# Patient Record
Sex: MALE | Race: WHITE | NOT HISPANIC OR LATINO | URBAN - METROPOLITAN AREA
[De-identification: names, ages, dates, MRNs, and addresses within clinical notes are randomized per-mention and may not be internally consistent; named-entity substitution may affect disease eponyms.]

---

## 2017-05-10 ENCOUNTER — OUTPATIENT (OUTPATIENT)
Dept: OUTPATIENT SERVICES | Facility: HOSPITAL | Age: 62
LOS: 1 days | Discharge: HOME | End: 2017-05-10

## 2017-06-28 DIAGNOSIS — R10.13 EPIGASTRIC PAIN: ICD-10-CM

## 2018-08-28 PROBLEM — Z00.00 ENCOUNTER FOR PREVENTIVE HEALTH EXAMINATION: Status: ACTIVE | Noted: 2018-08-28

## 2018-10-04 ENCOUNTER — APPOINTMENT (OUTPATIENT)
Dept: UROLOGY | Facility: CLINIC | Age: 63
End: 2018-10-04
Payer: COMMERCIAL

## 2018-10-04 VITALS
DIASTOLIC BLOOD PRESSURE: 63 MMHG | WEIGHT: 240 LBS | SYSTOLIC BLOOD PRESSURE: 120 MMHG | HEIGHT: 72 IN | BODY MASS INDEX: 32.51 KG/M2 | HEART RATE: 84 BPM

## 2018-10-04 DIAGNOSIS — I10 ESSENTIAL (PRIMARY) HYPERTENSION: ICD-10-CM

## 2018-10-04 DIAGNOSIS — Z78.9 OTHER SPECIFIED HEALTH STATUS: ICD-10-CM

## 2018-10-04 DIAGNOSIS — R97.20 ELEVATED PROSTATE, SPECIFIC ANTIGEN [PSA]: ICD-10-CM

## 2018-10-04 DIAGNOSIS — Z82.49 FAMILY HISTORY OF ISCHEMIC HEART DISEASE AND OTHER DISEASES OF THE CIRCULATORY SYSTEM: ICD-10-CM

## 2018-10-04 DIAGNOSIS — Z81.8 FAMILY HISTORY OF OTHER MENTAL AND BEHAVIORAL DISORDERS: ICD-10-CM

## 2018-10-04 DIAGNOSIS — E78.00 PURE HYPERCHOLESTEROLEMIA, UNSPECIFIED: ICD-10-CM

## 2018-10-04 DIAGNOSIS — R39.9 UNSPECIFIED SYMPTOMS AND SIGNS INVOLVING THE GENITOURINARY SYSTEM: ICD-10-CM

## 2018-10-04 DIAGNOSIS — Z84.1 FAMILY HISTORY OF DISORDERS OF KIDNEY AND URETER: ICD-10-CM

## 2018-10-04 DIAGNOSIS — Z80.42 FAMILY HISTORY OF MALIGNANT NEOPLASM OF PROSTATE: ICD-10-CM

## 2018-10-04 PROCEDURE — 99203 OFFICE O/P NEW LOW 30 MIN: CPT

## 2018-10-04 NOTE — PHYSICAL EXAM
[General Appearance - Well Developed] : well developed [General Appearance - Well Nourished] : well nourished [Normal Appearance] : normal appearance [Well Groomed] : well groomed [General Appearance - In No Acute Distress] : no acute distress [Edema] : no peripheral edema [Respiration, Rhythm And Depth] : normal respiratory rhythm and effort [Exaggerated Use Of Accessory Muscles For Inspiration] : no accessory muscle use [Abdomen Soft] : soft [Abdomen Tenderness] : non-tender [Costovertebral Angle Tenderness] : no ~M costovertebral angle tenderness [Normal Station and Gait] : the gait and station were normal for the patient's age [] : no rash [No Focal Deficits] : no focal deficits [Oriented To Time, Place, And Person] : oriented to person, place, and time [Affect] : the affect was normal [Mood] : the mood was normal [Not Anxious] : not anxious

## 2018-10-08 NOTE — LETTER BODY
[Dear  ___] : Dear  [unfilled], [Consult Letter:] : I had the pleasure of evaluating your patient, [unfilled]. [Please see my note below.] : Please see my note below. [Consult Closing:] : Thank you very much for allowing me to participate in the care of this patient.  If you have any questions, please do not hesitate to contact me. [Sincerely,] : Sincerely, [FreeTextEntry2] : Dr. Poncho Ochoa

## 2018-10-08 NOTE — HISTORY OF PRESENT ILLNESS
[Currently Experiencing ___] :  [unfilled] [Urinary Retention] : urinary retention [Urinary Urgency] : urinary urgency [Urinary Frequency] : urinary frequency [Nocturia] : nocturia [Intermittency] : intermittency [None] : None [FreeTextEntry1] : JADEN ARANA is a 63 year old male with a past medical history of HTN and LUTS. Presents to the office today for elevated PSA referred by PMD. Never had a prostate biopsy done in the past. Patient had PSA done and 4.5 ng/ml compared to 1.8 ng/ml in 2017.Patient currently has urinary frequency, urgency, nocturia 3 -4 x a night. Patient was placed on Tamsulosin x 1 month prescribed by PMD and stopped it on its own, saw minimal difference in urine and had retrograde ejaculation.Denies hematuria and dysuria.Never a smoker. Patient father diagnosed of prostate cancer at age 60. Patients brother diagnosed with bladder cancer at age 58. \par \par 8/15/2018\par PSA 4.5 ng/ml [Urinary Incontinence] : no urinary incontinence [Straining] : no straining [Weak Stream] : no weak stream [Dysuria] : no dysuria [Hematuria - Gross] : no gross hematuria

## 2018-10-08 NOTE — ASSESSMENT
[FreeTextEntry1] : 63 year old with a history of LUTS and elevated PSA\par \par -UA and urine culture ordered\par -Renal/Bladder US ordered\par -4 K Test ordered\par F/U in 3 weeks

## 2018-10-09 ENCOUNTER — OUTPATIENT (OUTPATIENT)
Dept: OUTPATIENT SERVICES | Facility: HOSPITAL | Age: 63
LOS: 1 days | Discharge: HOME | End: 2018-10-09

## 2018-10-09 DIAGNOSIS — R39.9 UNSPECIFIED SYMPTOMS AND SIGNS INVOLVING THE GENITOURINARY SYSTEM: ICD-10-CM

## 2018-10-29 ENCOUNTER — APPOINTMENT (OUTPATIENT)
Dept: UROLOGY | Facility: CLINIC | Age: 63
End: 2018-10-29
Payer: COMMERCIAL

## 2018-10-29 VITALS
SYSTOLIC BLOOD PRESSURE: 137 MMHG | HEIGHT: 72 IN | HEART RATE: 73 BPM | WEIGHT: 245 LBS | BODY MASS INDEX: 33.18 KG/M2 | DIASTOLIC BLOOD PRESSURE: 80 MMHG

## 2018-10-29 PROCEDURE — 99214 OFFICE O/P EST MOD 30 MIN: CPT

## 2018-10-29 NOTE — LETTER BODY
[Dear  ___] : Dear  [unfilled], [Consult Letter:] : I had the pleasure of evaluating your patient, [unfilled]. [Please see my note below.] : Please see my note below. [Consult Closing:] : Thank you very much for allowing me to participate in the care of this patient.  If you have any questions, please do not hesitate to contact me. [Sincerely,] : Sincerely, [FreeTextEntry3] : Alfredo Rodriguez MD, FACS\par

## 2019-04-18 ENCOUNTER — APPOINTMENT (OUTPATIENT)
Dept: UROLOGY | Facility: CLINIC | Age: 64
End: 2019-04-18
Payer: COMMERCIAL

## 2019-04-18 PROCEDURE — 99213 OFFICE O/P EST LOW 20 MIN: CPT

## 2019-04-18 NOTE — HISTORY OF PRESENT ILLNESS
[Nocturia] : nocturia [FreeTextEntry1] : 62 yo with elevated PSA and lower urinary tract symptoms - mostly nocturia\par patient had tried flomax in the past without improvement\par NOW IS ONLY ON TUMERIC\par \par renal and bladder US 10/9/2018\par unremarkable renal and bladder ultrasound\par prostate 78 grams\par median lobe hypertrophy\par prevoid 527 with 45 cc\par \par 4k score of 8  10/2018\par PSA 2.45 ng/ml\par \par REPEAT PSA 4/2019\par 2.6 NG/ML\par \par \par

## 2019-04-18 NOTE — PHYSICAL EXAM
[General Appearance - Well Nourished] : well nourished [General Appearance - Well Developed] : well developed [Normal Appearance] : normal appearance [Well Groomed] : well groomed [General Appearance - In No Acute Distress] : no acute distress [Abdomen Soft] : soft [Abdomen Tenderness] : non-tender [Costovertebral Angle Tenderness] : no ~M costovertebral angle tenderness [Edema] : no peripheral edema [] : no respiratory distress [Exaggerated Use Of Accessory Muscles For Inspiration] : no accessory muscle use [Respiration, Rhythm And Depth] : normal respiratory rhythm and effort [Oriented To Time, Place, And Person] : oriented to person, place, and time [Mood] : the mood was normal [Affect] : the affect was normal [Not Anxious] : not anxious [Normal Station and Gait] : the gait and station were normal for the patient's age [No Focal Deficits] : no focal deficits [No Palpable Adenopathy] : no palpable adenopathy

## 2019-04-18 NOTE — LETTER BODY
[Dear  ___] : Dear  [unfilled], [Consult Letter:] : I had the pleasure of evaluating your patient, [unfilled]. [Consult Closing:] : Thank you very much for allowing me to participate in the care of this patient.  If you have any questions, please do not hesitate to contact me. [Please see my note below.] : Please see my note below. [Sincerely,] : Sincerely, [FreeTextEntry3] : Alfredo Rodriguez MD, FACS\par

## 2019-04-18 NOTE — ASSESSMENT
[FreeTextEntry1] : 62 yo with elevated PSA with dramatic drop AND STABILIZATION in PSA without intervention \par \par \par all his questions were answered\par \par - PSA in 12 months\par - renal and bladder us with pvr in 12 months\par - F/U in 12 months\par

## 2019-09-27 ENCOUNTER — OUTPATIENT (OUTPATIENT)
Dept: OUTPATIENT SERVICES | Facility: HOSPITAL | Age: 64
LOS: 1 days | Discharge: HOME | End: 2019-09-27
Payer: COMMERCIAL

## 2019-09-27 DIAGNOSIS — R97.20 ELEVATED PROSTATE SPECIFIC ANTIGEN [PSA]: ICD-10-CM

## 2019-09-27 PROCEDURE — 76770 US EXAM ABDO BACK WALL COMP: CPT | Mod: 26

## 2019-10-31 ENCOUNTER — APPOINTMENT (OUTPATIENT)
Dept: UROLOGY | Facility: CLINIC | Age: 64
End: 2019-10-31
Payer: COMMERCIAL

## 2019-10-31 DIAGNOSIS — N13.8 BENIGN PROSTATIC HYPERPLASIA WITH LOWER URINARY TRACT SYMPMS: ICD-10-CM

## 2019-10-31 DIAGNOSIS — N40.1 BENIGN PROSTATIC HYPERPLASIA WITH LOWER URINARY TRACT SYMPMS: ICD-10-CM

## 2019-10-31 DIAGNOSIS — R97.20 ELEVATED PROSTATE, SPECIFIC ANTIGEN [PSA]: ICD-10-CM

## 2019-10-31 PROCEDURE — 99214 OFFICE O/P EST MOD 30 MIN: CPT

## 2019-11-05 PROBLEM — R97.20 ELEVATED PROSTATE SPECIFIC ANTIGEN (PSA): Status: ACTIVE | Noted: 2018-10-08

## 2019-11-05 PROBLEM — N40.1 BENIGN LOCALIZED HYPERPLASIA OF PROSTATE WITH URINARY OBSTRUCTION: Status: ACTIVE | Noted: 2018-10-29

## 2019-11-05 NOTE — HISTORY OF PRESENT ILLNESS
[Nocturia] : nocturia [FreeTextEntry1] : 62 yo with elevated PSA and lower urinary tract symptoms - mostly nocturia\par patient had tried flomax in the past without improvement\par NOW IS ONLY ON TUMERIC\par \par 10/2019 renal and bladder US\par FINDINGS: \par \par RIGHT KIDNEY: Normal in echogenicity, size measuring 12.3 cm in length. No \par evidence of hydronephrosis, calculus or solid mass. 1.3 cm lower pole cyst \par is present, without significant change since the prior exam. Vascular flow \par is demonstrated at the hilum. \par \par LEFT KIDNEY: Normal in echogenicity, size measuring 12.7 cm in length. No \par evidence of hydronephrosis, calculus or solid mass. 0.8 cm interpolar cyst \par is present, without significant change since the prior exam. Vascular flow \par is demonstrated at the hilum. \par \par URINARY BLADDER: Prevoid volume of approximately 342 cc. No wall \par thickening, debris or calculus is seen. Bilateral ureteral jets are \par visualized. Postvoid volume is approximately 37 cc. \par \par PROSTATE: The prostate measures approximately 67 cc, enlarged. \par \par IMPRESSION: \par \par Since 10/9/2018: \par \par Prostatomegaly, measuring 67 cc in volume, previously 78 cc. \par \par Post void residual of 37 cc. \par \par No stones or hydronephrosis. \par \par \par renal and bladder US 10/9/2018\par unremarkable renal and bladder ultrasound\par prostate 78 grams\par median lobe hypertrophy\par prevoid 527 with 45 cc\par \par 4k score of 8  10/2018\par PSA 2.45 ng/ml\par \par REPEAT PSA 4/2019\par 2.6 NG/ML\par \par PSA 10/2019\par 3.2 ng/ml\par UA wnl \par

## 2019-11-05 NOTE — ASSESSMENT
[FreeTextEntry1] : 63 yo with elevated PSA \par \par - MRI prostate\par - f/u after MRI to discuss standard vs MRI guided biopsy\par patient will need biopsy irrespective

## 2019-11-07 ENCOUNTER — OUTPATIENT (OUTPATIENT)
Dept: OUTPATIENT SERVICES | Facility: HOSPITAL | Age: 64
LOS: 1 days | Discharge: HOME | End: 2019-11-07
Payer: COMMERCIAL

## 2019-11-07 DIAGNOSIS — R05 COUGH: ICD-10-CM

## 2019-11-07 PROCEDURE — 71046 X-RAY EXAM CHEST 2 VIEWS: CPT | Mod: 26

## 2019-12-05 ENCOUNTER — OTHER (OUTPATIENT)
Age: 64
End: 2019-12-05

## 2019-12-19 ENCOUNTER — APPOINTMENT (OUTPATIENT)
Dept: UROLOGY | Facility: CLINIC | Age: 64
End: 2019-12-19

## 2020-11-29 ENCOUNTER — OUTPATIENT (OUTPATIENT)
Dept: OUTPATIENT SERVICES | Facility: HOSPITAL | Age: 65
LOS: 1 days | Discharge: HOME | End: 2020-11-29

## 2020-11-29 DIAGNOSIS — Z11.59 ENCOUNTER FOR SCREENING FOR OTHER VIRAL DISEASES: ICD-10-CM

## 2020-12-02 ENCOUNTER — OUTPATIENT (OUTPATIENT)
Dept: OUTPATIENT SERVICES | Facility: HOSPITAL | Age: 65
LOS: 1 days | Discharge: HOME | End: 2020-12-02

## 2020-12-02 VITALS
OXYGEN SATURATION: 100 % | RESPIRATION RATE: 20 BRPM | WEIGHT: 265 LBS | HEART RATE: 82 BPM | SYSTOLIC BLOOD PRESSURE: 124 MMHG | DIASTOLIC BLOOD PRESSURE: 72 MMHG | TEMPERATURE: 97 F | HEIGHT: 72 IN

## 2020-12-02 VITALS — DIASTOLIC BLOOD PRESSURE: 67 MMHG | SYSTOLIC BLOOD PRESSURE: 139 MMHG | RESPIRATION RATE: 15 BRPM | HEART RATE: 76 BPM

## 2020-12-02 DIAGNOSIS — Z98.1 ARTHRODESIS STATUS: Chronic | ICD-10-CM

## 2020-12-04 DIAGNOSIS — E78.00 PURE HYPERCHOLESTEROLEMIA, UNSPECIFIED: ICD-10-CM

## 2020-12-04 DIAGNOSIS — H52.202 UNSPECIFIED ASTIGMATISM, LEFT EYE: ICD-10-CM

## 2020-12-04 DIAGNOSIS — I10 ESSENTIAL (PRIMARY) HYPERTENSION: ICD-10-CM

## 2020-12-04 DIAGNOSIS — E11.9 TYPE 2 DIABETES MELLITUS WITHOUT COMPLICATIONS: ICD-10-CM

## 2020-12-04 DIAGNOSIS — H52.4 PRESBYOPIA: ICD-10-CM

## 2020-12-04 DIAGNOSIS — H25.12 AGE-RELATED NUCLEAR CATARACT, LEFT EYE: ICD-10-CM

## 2020-12-04 DIAGNOSIS — Z98.1 ARTHRODESIS STATUS: ICD-10-CM

## 2020-12-13 ENCOUNTER — OUTPATIENT (OUTPATIENT)
Dept: OUTPATIENT SERVICES | Facility: HOSPITAL | Age: 65
LOS: 1 days | Discharge: HOME | End: 2020-12-13

## 2020-12-13 DIAGNOSIS — Z11.59 ENCOUNTER FOR SCREENING FOR OTHER VIRAL DISEASES: ICD-10-CM

## 2020-12-13 DIAGNOSIS — Z98.1 ARTHRODESIS STATUS: Chronic | ICD-10-CM

## 2020-12-15 NOTE — ASU PATIENT PROFILE, ADULT - ALCOHOL USE HISTORY SINGLE SELECT
Detail Level: Detailed
Continue Regimen: Clobetasol scalp solution 0.05% twice a day\\nBiotin 5mg take 1 daily
never

## 2020-12-16 ENCOUNTER — OUTPATIENT (OUTPATIENT)
Dept: OUTPATIENT SERVICES | Facility: HOSPITAL | Age: 65
LOS: 1 days | Discharge: HOME | End: 2020-12-16

## 2020-12-16 VITALS — DIASTOLIC BLOOD PRESSURE: 68 MMHG | SYSTOLIC BLOOD PRESSURE: 138 MMHG | HEART RATE: 72 BPM | RESPIRATION RATE: 17 BRPM

## 2020-12-16 VITALS
DIASTOLIC BLOOD PRESSURE: 60 MMHG | SYSTOLIC BLOOD PRESSURE: 126 MMHG | HEIGHT: 60 IN | TEMPERATURE: 96 F | WEIGHT: 263.89 LBS | OXYGEN SATURATION: 97 % | RESPIRATION RATE: 18 BRPM | HEART RATE: 79 BPM

## 2020-12-16 DIAGNOSIS — Z98.1 ARTHRODESIS STATUS: Chronic | ICD-10-CM

## 2020-12-16 DIAGNOSIS — H26.9 UNSPECIFIED CATARACT: Chronic | ICD-10-CM

## 2020-12-16 NOTE — CHART NOTE - NSCHARTNOTEFT_GEN_A_CORE
PACU ANESTHESIA ADMISSION NOTE      Procedure: Right Eye Cataract Extraction with IOL  Post op diagnosis:  Right Eye Cataract    ____  Intubated  TV:______       Rate: ______      FiO2: ______    __x__  Patent Airway    __x__  Full return of protective reflexes    __x__  Full recovery from anesthesia / back to baseline     Vitals:   T:   37        R:         20         BP:     137/68             Sat:       99            P: 65      Mental Status:  __x__ Awake   _____ Alert   _____ Drowsy   _____ Sedated    Nausea/Vomiting:  __x__ NO  ______Yes,   See Post - Op Orders          Pain Scale (0-10):  _0____    Treatment: ____ None    ___x_ See Post - Op/PCA Orders    Post - Operative Fluids:   ____ Oral   ___x_ See Post - Op Orders    Plan: Discharge:   _x___Home       _____Floor     _____Critical Care    _____  Other:_________________    Comments:

## 2020-12-22 DIAGNOSIS — H52.4 PRESBYOPIA: ICD-10-CM

## 2020-12-22 DIAGNOSIS — E11.9 TYPE 2 DIABETES MELLITUS WITHOUT COMPLICATIONS: ICD-10-CM

## 2020-12-22 DIAGNOSIS — N40.0 BENIGN PROSTATIC HYPERPLASIA WITHOUT LOWER URINARY TRACT SYMPTOMS: ICD-10-CM

## 2020-12-22 DIAGNOSIS — Z98.1 ARTHRODESIS STATUS: ICD-10-CM

## 2020-12-22 DIAGNOSIS — H25.11 AGE-RELATED NUCLEAR CATARACT, RIGHT EYE: ICD-10-CM

## 2020-12-22 DIAGNOSIS — I10 ESSENTIAL (PRIMARY) HYPERTENSION: ICD-10-CM

## 2020-12-22 DIAGNOSIS — E78.5 HYPERLIPIDEMIA, UNSPECIFIED: ICD-10-CM

## 2020-12-22 DIAGNOSIS — E78.00 PURE HYPERCHOLESTEROLEMIA, UNSPECIFIED: ICD-10-CM

## 2021-05-02 ENCOUNTER — OUTPATIENT (OUTPATIENT)
Dept: OUTPATIENT SERVICES | Facility: HOSPITAL | Age: 66
LOS: 1 days | Discharge: HOME | End: 2021-05-02
Payer: MEDICARE

## 2021-05-02 DIAGNOSIS — Z13.1 ENCOUNTER FOR SCREENING FOR DIABETES MELLITUS: ICD-10-CM

## 2021-05-02 DIAGNOSIS — H26.9 UNSPECIFIED CATARACT: Chronic | ICD-10-CM

## 2021-05-02 DIAGNOSIS — Z98.1 ARTHRODESIS STATUS: Chronic | ICD-10-CM

## 2021-05-02 PROCEDURE — 93880 EXTRACRANIAL BILAT STUDY: CPT | Mod: 26

## 2021-07-20 ENCOUNTER — OUTPATIENT (OUTPATIENT)
Dept: OUTPATIENT SERVICES | Facility: HOSPITAL | Age: 66
LOS: 1 days | Discharge: HOME | End: 2021-07-20
Payer: MEDICARE

## 2021-07-20 DIAGNOSIS — Z98.1 ARTHRODESIS STATUS: Chronic | ICD-10-CM

## 2021-07-20 DIAGNOSIS — H26.9 UNSPECIFIED CATARACT: Chronic | ICD-10-CM

## 2021-07-20 DIAGNOSIS — R10.32 LEFT LOWER QUADRANT PAIN: ICD-10-CM

## 2021-07-20 PROCEDURE — 74176 CT ABD & PELVIS W/O CONTRAST: CPT | Mod: 26,MH

## 2021-12-18 ENCOUNTER — OUTPATIENT (OUTPATIENT)
Dept: OUTPATIENT SERVICES | Facility: HOSPITAL | Age: 66
LOS: 1 days | Discharge: HOME | End: 2021-12-18
Payer: MEDICARE

## 2021-12-18 DIAGNOSIS — R05.9 COUGH, UNSPECIFIED: ICD-10-CM

## 2021-12-18 DIAGNOSIS — Z98.1 ARTHRODESIS STATUS: Chronic | ICD-10-CM

## 2021-12-18 DIAGNOSIS — H26.9 UNSPECIFIED CATARACT: Chronic | ICD-10-CM

## 2021-12-18 PROCEDURE — 71046 X-RAY EXAM CHEST 2 VIEWS: CPT | Mod: 26

## 2022-01-03 NOTE — ASU PATIENT PROFILE, ADULT - MEDICATION HERBAL REMEDIES, PROFILE
[facility]  [facility]  [address], [address]  [place] [address]  [phone]    They will contact you about a counseling appointment.       [facility].     over the counter hydrocortisone 2 times a day for 7-10 days. If still there take a 1 week break and then restart.      Check blood pressure at home. Send Criselda a HBCS message with your readings sometime later this week      Patient Education     Preventive Health Recommendations  Male Ages 40 to 49    Yearly exam:             See your health care provider every year in order to  o   Review health changes.   o   Discuss preventive care.    o   Review your medicines if your doctor has prescribed any.    You should be tested each year for STDs (sexually transmitted diseases) if you re at risk.     Have a cholesterol test every 5 years.     Have a colonoscopy (test for colon cancer) if someone in your family has had colon cancer or polyps before age 50.     After age 45, have a diabetes test (fasting glucose). If you are at risk for diabetes, you should have this test every 3 years.      Talk with your health care provider about whether or not a prostate cancer screening test (PSA) is right for you.    Shots: Get a flu shot each year. Get a tetanus shot every 10 years.     Nutrition:    Eat at least 5 servings of fruits and vegetables daily.     Eat whole-grain bread, whole-wheat pasta and brown rice instead of white grains and rice.     Get adequate Calcium and Vitamin D.     Lifestyle    Exercise for at least 150 minutes a week (30 minutes a day, 5 days a week). This will help you control your weight and prevent disease.     Limit alcohol to one drink per day.     No smoking.     Wear sunscreen to prevent skin cancer.     See your dentist every six months for an exam and cleaning.      
no

## 2022-04-29 ENCOUNTER — OUTPATIENT (OUTPATIENT)
Dept: OUTPATIENT SERVICES | Facility: HOSPITAL | Age: 67
LOS: 1 days | Discharge: HOME | End: 2022-04-29
Payer: MEDICARE

## 2022-04-29 DIAGNOSIS — Z98.1 ARTHRODESIS STATUS: Chronic | ICD-10-CM

## 2022-04-29 DIAGNOSIS — H26.9 UNSPECIFIED CATARACT: Chronic | ICD-10-CM

## 2022-04-29 DIAGNOSIS — R10.9 UNSPECIFIED ABDOMINAL PAIN: ICD-10-CM

## 2022-04-29 PROCEDURE — 74177 CT ABD & PELVIS W/CONTRAST: CPT | Mod: 26,MH

## 2022-06-06 ENCOUNTER — OUTPATIENT (OUTPATIENT)
Dept: OUTPATIENT SERVICES | Facility: HOSPITAL | Age: 67
LOS: 1 days | Discharge: HOME | End: 2022-06-06
Payer: MEDICARE

## 2022-06-06 DIAGNOSIS — R91.1 SOLITARY PULMONARY NODULE: ICD-10-CM

## 2022-06-06 DIAGNOSIS — Z98.1 ARTHRODESIS STATUS: Chronic | ICD-10-CM

## 2022-06-06 DIAGNOSIS — H26.9 UNSPECIFIED CATARACT: Chronic | ICD-10-CM

## 2022-06-06 PROCEDURE — 71250 CT THORAX DX C-: CPT | Mod: 26,MH

## 2022-07-29 NOTE — ASU PREOP CHECKLIST - BSA (M2)
Post-Op Assessment Note    CV Status:  Stable  Pain Score: 0    Pain management: adequate     Mental Status:  Alert and awake   Hydration Status:  Euvolemic   PONV Controlled:  Controlled   Airway Patency:  Patent      Post Op Vitals Reviewed: Yes      Staff: CRNA         No complications documented      /65 (07/29/22 0947)    Temp (!) 96 8 °F (36 °C) (07/29/22 0947)    Pulse 64 (07/29/22 0947)   Resp 15 (07/29/22 0947)    SpO2 97 % (07/29/22 0947) 0

## 2022-10-17 ENCOUNTER — OUTPATIENT (OUTPATIENT)
Dept: OUTPATIENT SERVICES | Facility: HOSPITAL | Age: 67
LOS: 1 days | Discharge: HOME | End: 2022-10-17

## 2022-10-17 DIAGNOSIS — H26.9 UNSPECIFIED CATARACT: Chronic | ICD-10-CM

## 2022-10-17 DIAGNOSIS — Z98.1 ARTHRODESIS STATUS: Chronic | ICD-10-CM

## 2022-10-17 DIAGNOSIS — R91.1 SOLITARY PULMONARY NODULE: ICD-10-CM

## 2022-10-17 PROCEDURE — 71250 CT THORAX DX C-: CPT | Mod: 26,MH

## 2022-11-15 ENCOUNTER — APPOINTMENT (OUTPATIENT)
Dept: PLASTIC SURGERY | Facility: CLINIC | Age: 67
End: 2022-11-15

## 2022-11-15 VITALS — HEIGHT: 72 IN | WEIGHT: 220 LBS | BODY MASS INDEX: 29.8 KG/M2

## 2022-11-15 PROCEDURE — 99203 OFFICE O/P NEW LOW 30 MIN: CPT

## 2022-11-15 NOTE — ASSESSMENT
[FreeTextEntry1] : Regarding the procedure, we discussed scarring, poor wound healing, bleeding, infection, need for additional surgery, and dissatisfaction with the outcome.  Also discussed possibility of keloid and/or hypertrophic scar formation as well as recurrence.  All questions were answered and risks understood.\par \par office procedure\par \par Due to COVID 19, pre-visit patient instructions were explained to the patient and their symptoms were checked upon arrival.  \par Masks were used by the health care providers and staff and the examination room was cleaned after the patient visit was completed.\par

## 2022-11-15 NOTE — PHYSICAL EXAM
[NI] : Normal [de-identified] : right upepr lateral arm subq cyst approx 4mm x 6mm   mobile, firm

## 2022-11-15 NOTE — HISTORY OF PRESENT ILLNESS
[FreeTextEntry1] : 67 yr old male with right upper arm subq lesion present for a few years\par \par derm saw and referred here\par \par nonsmoker\par nondiabetic\par BPH\par HTN\par \par

## 2022-12-22 ENCOUNTER — APPOINTMENT (OUTPATIENT)
Dept: PLASTIC SURGERY | Facility: CLINIC | Age: 67
End: 2022-12-22

## 2023-01-05 ENCOUNTER — APPOINTMENT (OUTPATIENT)
Dept: PLASTIC SURGERY | Facility: CLINIC | Age: 68
End: 2023-01-05

## 2023-01-11 ENCOUNTER — APPOINTMENT (OUTPATIENT)
Dept: PLASTIC SURGERY | Facility: CLINIC | Age: 68
End: 2023-01-11
Payer: MEDICARE

## 2023-01-11 PROCEDURE — 11401 EXC TR-EXT B9+MARG 0.6-1 CM: CPT

## 2023-01-11 PROCEDURE — 13120 CMPLX RPR S/A/L 1.1-2.5 CM: CPT | Mod: 59

## 2023-01-11 NOTE — ASSESSMENT
[FreeTextEntry1] : 68 yo M with right upper arm epidermal inclusion cyst s/p office excision today under local anesthesia. Tolerated well. \par \par - wound care instructions discussed \par - f/u 10-12 days for pathology and suture removal

## 2023-01-11 NOTE — PROCEDURE
[___ ml Inj] : Anesthesia: [unfilled] ~Uml [1%] : 1% [With Epi] : with epinephrine [Betadine] : using betadine [FreeTextEntry1] : Right upper arm cyst 1x0.8 cm [FreeTextEntry2] : Excision of right upper arm cyst  [FreeTextEntry3] : local  [FreeTextEntry4] : minimal  [FreeTextEntry5] : none  [FreeTextEntry6] : After administration of local anesthetic agent and confirmation of appropriate effect, the area was prepped and draped in the usual sterile fashion. Incision was then made over the cyst with a #15 scalpel. Cyst was clearly visualized and carefully dissected out with dissection scissors and removed in its entirety without rupture. The wound was then irrigated with NS/Betadine mix and closed in layers with 3-0 Monocryl deep dermal and subcuticular. \par the incision measured 1.5 cm in length. \par \par Clean sterile dressing was then applied. \par \par Patient tolerated the procedure well and did not have any complications.  [FreeTextEntry7] : right upper arm cyst

## 2023-01-23 ENCOUNTER — APPOINTMENT (OUTPATIENT)
Dept: PLASTIC SURGERY | Facility: CLINIC | Age: 68
End: 2023-01-23
Payer: MEDICARE

## 2023-01-23 DIAGNOSIS — L72.0 EPIDERMAL CYST: ICD-10-CM

## 2023-01-23 PROCEDURE — 99212 OFFICE O/P EST SF 10 MIN: CPT

## 2023-01-23 NOTE — ASSESSMENT
[FreeTextEntry1] : 66 yo M with right upper arm cyst now POD#12 s/p office excision. Doing very well. No active issues. \par \par - suture tails removed, steri strip applied\par - may get wet\par - daily Aquaphor\par - start silicone-based products next week\par - pathology discussed - benign keratinous cyst\par - post-op instructions discussed and all his questions were answered\par - f/u PRN \par \par Due to COVID 19, pre-visit patient instructions were explained to the patient and their symptoms were checked upon arrival.  \par Masks were used by the health care providers and staff and the examination room was cleaned after the patient visit was completed.\par

## 2023-01-23 NOTE — PHYSICAL EXAM
[NI] : Normal [de-identified] : NAD [de-identified] : unlabored breathing  [de-identified] : Right upper arm incision healing very well, c/d/i, no erythema or swelling, good cosmesis

## 2023-01-23 NOTE — HISTORY OF PRESENT ILLNESS
[FreeTextEntry1] : 67 yr old male with PMHx of HTN, HLD, BPH with right upper arm subcutaneous lesion present for a few years\par \par derm saw and referred here\par \par nonsmoker\par nondiabetic\par \par Interval hx (1/23/23) Pt here POD#12 s/p excision of right upper arm cyst. Doing very well with no complaints of pain, f/c or drainage. \par

## 2023-01-23 NOTE — DATA REVIEWED
[FreeTextEntry1] :  Tissue Biopsy             Final\par \par No Documents Attached\par \par \par   Patient:   JADEN ARANA\par \par \par Accession:                             [id number]\par \par Collected Date/Time:                   1/11/2023 11:52 EST\par Received Date/Time:                    1/12/2023 09:13 EST\par \par Surgical Pathology Report - Auth (Verified)\par \par Specimen(s) Submitted\par Right upper arm cyst\par \par Final Diagnosis\par Right up cyst, excision:\par -Benign keratinous cyst.\par \par Verified by: Jennifer Man M.D.\par (Electronic Signature)\par Reported on: 01/13/23 18:48 EST, [facility] [address]\par Phone: [phone]   Fax: [phone]\par _________________________________________________________________\par \par Clinical Information\par Excision of right upper arm cyst - clinically epidermal inclusion cyst,\par \par  not ruptured\par \par Perioperative Diagnosis\par L72.0-  Epidermal cyst\par \par Gross Description\par Specimen received in formalin, labeled "right upper arm cyst".  Specimen\par  consists of an intact, hard, ovoid cyst, measuring 0.8 x 0.5 x 0.5 cm.\par   The external surface is inked black.  Specimen is bisected, the cross-\par section reveals tan-yellow, cheesy material.  Submitted entirely.  (1\par  block)\par \par Specimen was received and underwent gross examination at [place] [id number].\par \par 01/12/2023 13:33:57 EST   RR\par \par  \par \par  Ordered by: RITCHIE WELLS       Collected/Examined: 11Jan2023 11:52AM       \par Verification Required       Stage: Final       \par  Performed at: [facility] (Med Director: Alejo Garcia)       Resulted: 13Jan2023 06:48PM       Last Updated: 13Jan2023 06:48PM       Accession: [id number]

## 2023-07-05 ENCOUNTER — OUTPATIENT (OUTPATIENT)
Dept: OUTPATIENT SERVICES | Facility: HOSPITAL | Age: 68
LOS: 1 days | End: 2023-07-05
Payer: MEDICARE

## 2023-07-05 DIAGNOSIS — I25.10 ATHEROSCLEROTIC HEART DISEASE OF NATIVE CORONARY ARTERY WITHOUT ANGINA PECTORIS: ICD-10-CM

## 2023-07-05 DIAGNOSIS — Z00.8 ENCOUNTER FOR OTHER GENERAL EXAMINATION: ICD-10-CM

## 2023-07-05 DIAGNOSIS — H26.9 UNSPECIFIED CATARACT: Chronic | ICD-10-CM

## 2023-07-05 DIAGNOSIS — Z98.1 ARTHRODESIS STATUS: Chronic | ICD-10-CM

## 2023-07-05 PROCEDURE — 75571 CT HRT W/O DYE W/CA TEST: CPT | Mod: 26,MH

## 2023-07-05 PROCEDURE — 75571 CT HRT W/O DYE W/CA TEST: CPT

## 2023-07-06 DIAGNOSIS — I25.10 ATHEROSCLEROTIC HEART DISEASE OF NATIVE CORONARY ARTERY WITHOUT ANGINA PECTORIS: ICD-10-CM

## 2023-07-26 ENCOUNTER — OUTPATIENT (OUTPATIENT)
Dept: OUTPATIENT SERVICES | Facility: HOSPITAL | Age: 68
LOS: 1 days | End: 2023-07-26
Payer: MEDICARE

## 2023-07-26 DIAGNOSIS — Z00.8 ENCOUNTER FOR OTHER GENERAL EXAMINATION: ICD-10-CM

## 2023-07-26 DIAGNOSIS — H26.9 UNSPECIFIED CATARACT: Chronic | ICD-10-CM

## 2023-07-26 DIAGNOSIS — Z98.1 ARTHRODESIS STATUS: Chronic | ICD-10-CM

## 2023-07-26 DIAGNOSIS — R07.9 CHEST PAIN, UNSPECIFIED: ICD-10-CM

## 2023-07-26 PROCEDURE — 71275 CT ANGIOGRAPHY CHEST: CPT

## 2023-07-26 PROCEDURE — 71275 CT ANGIOGRAPHY CHEST: CPT | Mod: 26,MH

## 2023-07-27 DIAGNOSIS — R07.9 CHEST PAIN, UNSPECIFIED: ICD-10-CM

## 2024-01-18 ENCOUNTER — APPOINTMENT (OUTPATIENT)
Dept: ELECTROPHYSIOLOGY | Facility: CLINIC | Age: 69
End: 2024-01-18
Payer: MEDICARE

## 2024-01-18 VITALS
WEIGHT: 215 LBS | TEMPERATURE: 98 F | DIASTOLIC BLOOD PRESSURE: 73 MMHG | HEIGHT: 72 IN | HEART RATE: 81 BPM | SYSTOLIC BLOOD PRESSURE: 114 MMHG | BODY MASS INDEX: 29.12 KG/M2

## 2024-01-18 PROCEDURE — 99205 OFFICE O/P NEW HI 60 MIN: CPT

## 2024-01-18 PROCEDURE — 93000 ELECTROCARDIOGRAM COMPLETE: CPT

## 2024-01-18 RX ORDER — OMEPRAZOLE 40 MG/1
40 CAPSULE, DELAYED RELEASE ORAL
Qty: 90 | Refills: 0 | Status: ACTIVE | COMMUNITY

## 2024-01-18 RX ORDER — ASPIRIN 81 MG
81 TABLET, DELAYED RELEASE (ENTERIC COATED) ORAL DAILY
Refills: 0 | Status: ACTIVE | COMMUNITY

## 2024-01-18 RX ORDER — APIXABAN 5 MG/1
5 TABLET, FILM COATED ORAL
Qty: 60 | Refills: 0 | Status: ACTIVE | COMMUNITY

## 2024-01-18 RX ORDER — LISINOPRIL 10 MG/1
10 TABLET ORAL
Qty: 90 | Refills: 3 | Status: ACTIVE | COMMUNITY

## 2024-01-18 RX ORDER — AMLODIPINE BESYLATE AND BENAZEPRIL HYDROCHLORIDE 5; 20 MG/1; MG/1
5-20 CAPSULE ORAL
Refills: 0 | Status: COMPLETED | COMMUNITY
End: 2024-01-18

## 2024-01-18 RX ORDER — ALPRAZOLAM 0.5 MG/1
0.5 TABLET ORAL
Refills: 0 | Status: ACTIVE | COMMUNITY

## 2024-01-18 RX ORDER — ATORVASTATIN CALCIUM 10 MG/1
10 TABLET, FILM COATED ORAL
Refills: 0 | Status: COMPLETED | COMMUNITY
End: 2024-01-18

## 2024-01-18 RX ORDER — ATORVASTATIN CALCIUM 40 MG/1
40 TABLET, FILM COATED ORAL
Qty: 3 | Refills: 3 | Status: ACTIVE | COMMUNITY

## 2024-01-18 NOTE — HISTORY OF PRESENT ILLNESS
[FreeTextEntry1] : Mr. Sharpe is a 68-year-old male with PMHx of HTN, DLD, aortic root dilatation s/p aortic root replacement without aortic valve replacement, family history of CAD, AF, is here for management of AF.     Accompanied by his wife. Recently moved to [place], [place].      Occasional fatigue.    Had an MCOT which showed 10% AF.    Denies chest pain, shortness of breath, palpitation, dizziness or LOC except noted above.  EKG (01/18/2024): SR @ 81, , , QTc 430  Echo (01/2024): Normal EF, normal LV size, severe LAE.   Cardio: Dr. Dunn

## 2024-01-18 NOTE — ASSESSMENT
[FreeTextEntry1] : ## Paroxysmal Atrial Fibrillation   ## Aortic Root Dilatation s/p Aortic Surgery      - On Eliquis. Compliant. No bleeding issues. Patient to contact us if there is any bleeding issues, interruption or any issues with OAC. Patient to go to ER/call 911 if any major bleeding. - MCOT, echo, outside EKG, and outside note reviewed.   - Appears that patient has new diagnosis of AF s/p surgery. The MCOT showed a 10% burden. However, the EF episodes were distributed among 3 days. The rest of the days he did not have AF and he claims that he was in sinus most of the days.   - I discussed long-term monitoring with ILR with intent to stop anticoagulation if there is no AF.   - If AF episodes are correlated with his fatigue, we will discuss further management with antiarrhythmic versus ablation. Risks versus benefits discussed with the patient. After discussing all pros and cons, we agreed to loop recorder, which will serve two purposes. One purpose is long-term monitoring of AF, and the second purpose is correlation of symptoms with AF. He had an MCOT prior which did not show any symptom correlation. In this scenario, ILR is better because it will also help us with long-term monitoring and with the possibility of discontinuation of anticoagulation safely.    - Discussed importance of risk factor management. - Sleep study/Management of TRENT - Diet/exercise/weight loss - Management of GERD if present  - RTC after ILR.

## 2024-01-18 NOTE — ADDENDUM
[FreeTextEntry1] : Iris LOVE assisted in documentation on 01/18/2024 acting as a scribe for Dr. Mile Fermin.

## 2024-03-07 ENCOUNTER — APPOINTMENT (OUTPATIENT)
Dept: ELECTROPHYSIOLOGY | Facility: CLINIC | Age: 69
End: 2024-03-07

## 2024-04-24 ENCOUNTER — APPOINTMENT (OUTPATIENT)
Dept: ELECTROPHYSIOLOGY | Facility: CLINIC | Age: 69
End: 2024-04-24
Payer: MEDICARE

## 2024-04-24 VITALS
SYSTOLIC BLOOD PRESSURE: 120 MMHG | BODY MASS INDEX: 31.15 KG/M2 | DIASTOLIC BLOOD PRESSURE: 80 MMHG | HEART RATE: 97 BPM | WEIGHT: 230 LBS | HEIGHT: 72 IN

## 2024-04-24 PROCEDURE — 99215 OFFICE O/P EST HI 40 MIN: CPT

## 2024-04-24 PROCEDURE — 93000 ELECTROCARDIOGRAM COMPLETE: CPT

## 2024-04-24 RX ORDER — METOPROLOL SUCCINATE 50 MG/1
50 TABLET, EXTENDED RELEASE ORAL DAILY
Refills: 0 | Status: ACTIVE | COMMUNITY

## 2024-04-25 NOTE — ASSESSMENT
[FreeTextEntry1] : ## Paroxysmal Atrial Fibrillation   ## Aortic Root Dilatation s/p Aortic Surgery      - On Eliquis. Compliant. No bleeding issues. Patient to contact us if there is any bleeding issues, interruption or any issues with OAC. Patient to go to ER/call 911 if any major bleeding. - MCOT, echo, outside EKG, and outside note reviewed.   - We discussed multiple therapeutic options for the treatment of atrial fibrillation, including undergoing an atrial fibrillation/left atrial antral isolation ablation. The details of the procedure and risks associated with undergoing an atrial fibrillation/KARLA ablation were discussed in detail including, but not limited to, death, myocardial ischemia, stroke, cardiac perforation, pulmonary vein stenosis, diaphragmatic paralysis via phrenic nerve injury, catheter entrapment in the mitral valve or other location, bleeding, infection, deep vein thrombosis, vascular injury, and worsening atrial arrhythmias. We also discussed that there is a low risk of possible esophageal ulceration, atrial esophageal fistula, atrial bronchial fistula, or another complication requiring the need for major surgery to address.  We also discussed that recurrent atrial fibrillation in the first 2-3 months post procedure is a part of the healing process and has no impact on the overall longer- term success of the ablation. To try to reduce the incidence of these events, the plan will be for antiarrhythmic therapy to be restarted post procedure and continued for the first 3 months after ablation.  - He was discussing about PFA versus RF versus cryo. He had many questions. After detailed discussion of pros and cons, we decided that we will do RF for now considering that we will need to do more than just PVI because of severe left atrial enlargement. He understands and is agreeable.  - He prefers to do it in the first week of June due to prior commitments in May with his grandkids in [place].  - Discussed importance of risk factor management. - Sleep study/Management of TRENT - Diet/exercise/weight loss - Management of GERD if present  - Self-monitoring with Karida device.  - RTC after ablation.

## 2024-04-25 NOTE — ADDENDUM
[FreeTextEntry1] : Iris LOVE assisted in documentation on 04/25/2024 acting as a scribe for Dr. Mile Fermin.

## 2024-04-25 NOTE — HISTORY OF PRESENT ILLNESS
[FreeTextEntry1] : Mr. Sharpe is a 68-year-old male with PMHx of HTN, DLD, aortic root dilatation s/p aortic root replacement without aortic valve replacement, family history of CAD, AF, is here for management of AF.     Accompanied by his wife. Recently moved to [place], [place].      Occasional fatigue.    Had an MCOT which showed 10% AF.    04/24/2024: Not feeling well for the past three days. He went to Urgent Care and was found to be in sinus. However, on Tuesday, he started having AF which he could feel. Started taking Metoprolol after discussing with his cardiologist.       Denies chest pain, shortness of breath, palpitation, dizziness or LOC except noted above.  EKG (04/24/2024): AF @ 95  EKG (01/18/2024): SR @ 81, , , QTc 430  Echo (01/2024): Normal EF, normal LV size, severe LAE.   Cardio: Dr. Dunn

## 2024-05-23 ENCOUNTER — OUTPATIENT (OUTPATIENT)
Dept: OUTPATIENT SERVICES | Facility: HOSPITAL | Age: 69
LOS: 1 days | End: 2024-05-23
Payer: MEDICARE

## 2024-05-23 ENCOUNTER — RESULT REVIEW (OUTPATIENT)
Age: 69
End: 2024-05-23

## 2024-05-23 VITALS
RESPIRATION RATE: 16 BRPM | DIASTOLIC BLOOD PRESSURE: 70 MMHG | WEIGHT: 229.94 LBS | SYSTOLIC BLOOD PRESSURE: 145 MMHG | HEIGHT: 72 IN | OXYGEN SATURATION: 98 % | TEMPERATURE: 98 F | HEART RATE: 62 BPM

## 2024-05-23 DIAGNOSIS — I48.19 OTHER PERSISTENT ATRIAL FIBRILLATION: ICD-10-CM

## 2024-05-23 DIAGNOSIS — Z95.2 PRESENCE OF PROSTHETIC HEART VALVE: Chronic | ICD-10-CM

## 2024-05-23 DIAGNOSIS — H26.9 UNSPECIFIED CATARACT: Chronic | ICD-10-CM

## 2024-05-23 DIAGNOSIS — Z98.890 OTHER SPECIFIED POSTPROCEDURAL STATES: Chronic | ICD-10-CM

## 2024-05-23 DIAGNOSIS — Z98.1 ARTHRODESIS STATUS: Chronic | ICD-10-CM

## 2024-05-23 DIAGNOSIS — Z01.818 ENCOUNTER FOR OTHER PREPROCEDURAL EXAMINATION: ICD-10-CM

## 2024-05-23 DIAGNOSIS — Z98.49 CATARACT EXTRACTION STATUS, UNSPECIFIED EYE: Chronic | ICD-10-CM

## 2024-05-23 LAB
ALBUMIN SERPL ELPH-MCNC: 4.9 G/DL — SIGNIFICANT CHANGE UP (ref 3.5–5.2)
ALP SERPL-CCNC: 85 U/L — SIGNIFICANT CHANGE UP (ref 30–115)
ALT FLD-CCNC: 15 U/L — SIGNIFICANT CHANGE UP (ref 0–41)
ANION GAP SERPL CALC-SCNC: 15 MMOL/L — HIGH (ref 7–14)
AST SERPL-CCNC: 20 U/L — SIGNIFICANT CHANGE UP (ref 0–41)
BASOPHILS # BLD AUTO: 0.02 K/UL — SIGNIFICANT CHANGE UP (ref 0–0.2)
BASOPHILS NFR BLD AUTO: 0.3 % — SIGNIFICANT CHANGE UP (ref 0–1)
BILIRUB SERPL-MCNC: 1.2 MG/DL — SIGNIFICANT CHANGE UP (ref 0.2–1.2)
BLD GP AB SCN SERPL QL: SIGNIFICANT CHANGE UP
BUN SERPL-MCNC: 37 MG/DL — HIGH (ref 10–20)
CALCIUM SERPL-MCNC: 9.9 MG/DL — SIGNIFICANT CHANGE UP (ref 8.4–10.5)
CHLORIDE SERPL-SCNC: 101 MMOL/L — SIGNIFICANT CHANGE UP (ref 98–110)
CO2 SERPL-SCNC: 28 MMOL/L — SIGNIFICANT CHANGE UP (ref 17–32)
CREAT SERPL-MCNC: 1.5 MG/DL — SIGNIFICANT CHANGE UP (ref 0.7–1.5)
EGFR: 50 ML/MIN/1.73M2 — LOW
EOSINOPHIL # BLD AUTO: 0.12 K/UL — SIGNIFICANT CHANGE UP (ref 0–0.7)
EOSINOPHIL NFR BLD AUTO: 1.6 % — SIGNIFICANT CHANGE UP (ref 0–8)
GLUCOSE SERPL-MCNC: 81 MG/DL — SIGNIFICANT CHANGE UP (ref 70–99)
HCT VFR BLD CALC: 43.9 % — SIGNIFICANT CHANGE UP (ref 42–52)
HGB BLD-MCNC: 15 G/DL — SIGNIFICANT CHANGE UP (ref 14–18)
IMM GRANULOCYTES NFR BLD AUTO: 0.3 % — SIGNIFICANT CHANGE UP (ref 0.1–0.3)
LYMPHOCYTES # BLD AUTO: 1.93 K/UL — SIGNIFICANT CHANGE UP (ref 1.2–3.4)
LYMPHOCYTES # BLD AUTO: 25.8 % — SIGNIFICANT CHANGE UP (ref 20.5–51.1)
MCHC RBC-ENTMCNC: 29.5 PG — SIGNIFICANT CHANGE UP (ref 27–31)
MCHC RBC-ENTMCNC: 34.2 G/DL — SIGNIFICANT CHANGE UP (ref 32–37)
MCV RBC AUTO: 86.4 FL — SIGNIFICANT CHANGE UP (ref 80–94)
MONOCYTES # BLD AUTO: 0.72 K/UL — HIGH (ref 0.1–0.6)
MONOCYTES NFR BLD AUTO: 9.6 % — HIGH (ref 1.7–9.3)
NEUTROPHILS # BLD AUTO: 4.68 K/UL — SIGNIFICANT CHANGE UP (ref 1.4–6.5)
NEUTROPHILS NFR BLD AUTO: 62.4 % — SIGNIFICANT CHANGE UP (ref 42.2–75.2)
NRBC # BLD: 0 /100 WBCS — SIGNIFICANT CHANGE UP (ref 0–0)
PLATELET # BLD AUTO: 169 K/UL — SIGNIFICANT CHANGE UP (ref 130–400)
PMV BLD: 12.3 FL — HIGH (ref 7.4–10.4)
POTASSIUM SERPL-MCNC: 4.6 MMOL/L — SIGNIFICANT CHANGE UP (ref 3.5–5)
POTASSIUM SERPL-SCNC: 4.6 MMOL/L — SIGNIFICANT CHANGE UP (ref 3.5–5)
PROT SERPL-MCNC: 7.6 G/DL — SIGNIFICANT CHANGE UP (ref 6–8)
RBC # BLD: 5.08 M/UL — SIGNIFICANT CHANGE UP (ref 4.7–6.1)
RBC # FLD: 13.1 % — SIGNIFICANT CHANGE UP (ref 11.5–14.5)
SODIUM SERPL-SCNC: 144 MMOL/L — SIGNIFICANT CHANGE UP (ref 135–146)
WBC # BLD: 7.49 K/UL — SIGNIFICANT CHANGE UP (ref 4.8–10.8)
WBC # FLD AUTO: 7.49 K/UL — SIGNIFICANT CHANGE UP (ref 4.8–10.8)

## 2024-05-23 PROCEDURE — 93010 ELECTROCARDIOGRAM REPORT: CPT

## 2024-05-23 PROCEDURE — 86850 RBC ANTIBODY SCREEN: CPT

## 2024-05-23 PROCEDURE — 71046 X-RAY EXAM CHEST 2 VIEWS: CPT

## 2024-05-23 PROCEDURE — 85025 COMPLETE CBC W/AUTO DIFF WBC: CPT

## 2024-05-23 PROCEDURE — 86900 BLOOD TYPING SEROLOGIC ABO: CPT

## 2024-05-23 PROCEDURE — 99214 OFFICE O/P EST MOD 30 MIN: CPT | Mod: 25

## 2024-05-23 PROCEDURE — 36415 COLL VENOUS BLD VENIPUNCTURE: CPT

## 2024-05-23 PROCEDURE — 93005 ELECTROCARDIOGRAM TRACING: CPT

## 2024-05-23 PROCEDURE — 80053 COMPREHEN METABOLIC PANEL: CPT

## 2024-05-23 PROCEDURE — 71046 X-RAY EXAM CHEST 2 VIEWS: CPT | Mod: 26

## 2024-05-23 PROCEDURE — 86901 BLOOD TYPING SEROLOGIC RH(D): CPT

## 2024-05-23 RX ORDER — ATORVASTATIN CALCIUM 80 MG/1
1 TABLET, FILM COATED ORAL
Qty: 0 | Refills: 0 | DISCHARGE

## 2024-05-23 RX ORDER — AMLODIPINE BESYLATE 2.5 MG/1
1 TABLET ORAL
Qty: 0 | Refills: 0 | DISCHARGE

## 2024-05-23 NOTE — H&P PST ADULT - HISTORY OF PRESENT ILLNESS
Pt with hx of a-fib. States he was in surgery a while ago when he came out in a-fib. Has been controlled until recently. Denies any symptoms/concerns. Advised to proceed with above.    Denies any chest pain, difficulty breathing, SOB, palpitations, dysuria, URI, or any other infections in the last 2 weeks. Denies any suicidal or homicidal ideations. TRENT reviewed with patient.     Endorses this is their full medical & surgical history including medications prescribed. Pt verbalized understanding of all pre-operative instructions and was given the opportunity to ask questions and have them answered. They were instructed to follow up with their surgeon/proceduralists office with any additional questions regarding their procedure.    Anesthesia Alert  NO--Difficult Airway  NO--History of neck surgery or radiation  NO--Limited ROM of neck  NO--History of Malignant hyperthermia  NO--No personal or family history of Pseudocholinesterase deficiency.  NO--Prior Anesthesia Complication  NO--Latex Allergy  NO--Loose teeth  NO--History of Rheumatoid Arthritis  NO--TRENT  YES--Bleeding Risk  NO--Other_____    Revised Cardiac Risk Index for Pre-Operative Risk  RESULT SUMMARY:  2 points  Class III Risk    10.1 %  30-day risk of death, MI, or cardiac arrest    From [employer] 2017. These numbers are higher than those from the original study (Awais 1999). See Evidence for details.    INPUTS:  Elevated-risk surgery —> 1 = Yes  History of ischemic heart disease —> 1 = Yes  History of congestive heart failure —> 0 = No  History of cerebrovascular disease —> 0 = No  Pre-operative treatment with insulin —> 0 = No  Pre-operative creatinine >2 mg/dL / 176.8 µmol/L —> 0 = No    [facility] Activity Status Index (DASI)  RESULT SUMMARY:  42.2 points  The higher the score (maximum 58.2), the higher the functional status.    7.93 METs    INPUTS:  Take care of self —> 2.75 = Yes  Walk indoors —> 1.75 = Yes  Walk 1&ndash;2 blocks on level ground —> 2.75 = Yes  Climb a flight of stairs or walk up a hill —> 5.5 = Yes  Run a short distance —> 0 = No  Do light work around the house —> 2.7 = Yes  Do moderate work around the house —> 3.5 = Yes  Do heavy work around the house —> 0 = No  Do yardwork —> 4.5 = Yes  Have sexual relations —> 5.25 = Yes  Participate in moderate recreational activities —> 6 = Yes  Participate in strenuous sports —> 7.5 = Yes

## 2024-05-23 NOTE — H&P PST ADULT - NSANTHOSAYNRD_GEN_A_CORE
No. TRENT screening performed.  STOP BANG Legend: 0-2 = LOW Risk; 3-4 = INTERMEDIATE Risk; 5-8 = HIGH Risk

## 2024-05-23 NOTE — H&P PST ADULT - NSICDXPASTSURGICALHX_GEN_ALL_CORE_FT
PAST SURGICAL HISTORY:  H/O aortic root repair     H/O cataract extraction     H/O spinal fusion

## 2024-05-23 NOTE — H&P PST ADULT - NSICDXPASTMEDICALHX_GEN_ALL_CORE_FT
PAST MEDICAL HISTORY:  A-fib     Aortic root dilation     History of cataract     HTN (hypertension)

## 2024-05-23 NOTE — H&P PST ADULT - REASON FOR ADMISSION
70 yo male presents for PAST in preparation for a-fib ablation on 6/7/2024 under general anesthesia by Dr. Fermin ([facility]).

## 2024-05-24 DIAGNOSIS — Z01.818 ENCOUNTER FOR OTHER PREPROCEDURAL EXAMINATION: ICD-10-CM

## 2024-05-24 DIAGNOSIS — I48.19 OTHER PERSISTENT ATRIAL FIBRILLATION: ICD-10-CM

## 2024-06-07 ENCOUNTER — TRANSCRIPTION ENCOUNTER (OUTPATIENT)
Age: 69
End: 2024-06-07

## 2024-06-07 ENCOUNTER — APPOINTMENT (OUTPATIENT)
Dept: ELECTROPHYSIOLOGY | Facility: HOSPITAL | Age: 69
End: 2024-06-07

## 2024-06-07 ENCOUNTER — INPATIENT (INPATIENT)
Facility: HOSPITAL | Age: 69
LOS: 0 days | Discharge: HOME | DRG: 310 | End: 2024-06-08
Attending: INTERNAL MEDICINE | Admitting: STUDENT IN AN ORGANIZED HEALTH CARE EDUCATION/TRAINING PROGRAM
Payer: MEDICARE

## 2024-06-07 VITALS — WEIGHT: 229.28 LBS | HEIGHT: 71.65 IN

## 2024-06-07 DIAGNOSIS — Z98.890 OTHER SPECIFIED POSTPROCEDURAL STATES: Chronic | ICD-10-CM

## 2024-06-07 DIAGNOSIS — I48.19 OTHER PERSISTENT ATRIAL FIBRILLATION: ICD-10-CM

## 2024-06-07 DIAGNOSIS — Z98.1 ARTHRODESIS STATUS: Chronic | ICD-10-CM

## 2024-06-07 DIAGNOSIS — Z98.49 CATARACT EXTRACTION STATUS, UNSPECIFIED EYE: Chronic | ICD-10-CM

## 2024-06-07 PROCEDURE — C1730: CPT

## 2024-06-07 PROCEDURE — 93656 COMPRE EP EVAL ABLTJ ATR FIB: CPT

## 2024-06-07 PROCEDURE — C9399: CPT

## 2024-06-07 PROCEDURE — C1894: CPT

## 2024-06-07 PROCEDURE — 93655 ICAR CATH ABLTJ DSCRT ARRHYT: CPT

## 2024-06-07 PROCEDURE — 93005 ELECTROCARDIOGRAM TRACING: CPT

## 2024-06-07 PROCEDURE — C1760: CPT

## 2024-06-07 PROCEDURE — C2630: CPT

## 2024-06-07 PROCEDURE — C1769: CPT

## 2024-06-07 PROCEDURE — 93623 PRGRMD STIMJ&PACG IV RX NFS: CPT

## 2024-06-07 RX ORDER — ATORVASTATIN CALCIUM 80 MG/1
40 TABLET, FILM COATED ORAL AT BEDTIME
Refills: 0 | Status: DISCONTINUED | OUTPATIENT
Start: 2024-06-07 | End: 2024-06-08

## 2024-06-07 RX ORDER — FAMOTIDINE 10 MG/ML
20 INJECTION INTRAVENOUS ONCE
Refills: 0 | Status: COMPLETED | OUTPATIENT
Start: 2024-06-07 | End: 2024-06-07

## 2024-06-07 RX ORDER — APIXABAN 2.5 MG/1
1 TABLET, FILM COATED ORAL
Refills: 0 | DISCHARGE

## 2024-06-07 RX ORDER — ACETAMINOPHEN 500 MG
1000 TABLET ORAL ONCE
Refills: 0 | Status: COMPLETED | OUTPATIENT
Start: 2024-06-07 | End: 2024-06-07

## 2024-06-07 RX ORDER — SODIUM CHLORIDE 9 MG/ML
1000 INJECTION, SOLUTION INTRAVENOUS
Refills: 0 | Status: DISCONTINUED | OUTPATIENT
Start: 2024-06-07 | End: 2024-06-07

## 2024-06-07 RX ORDER — PANTOPRAZOLE SODIUM 20 MG/1
40 TABLET, DELAYED RELEASE ORAL DAILY
Refills: 0 | Status: DISCONTINUED | OUTPATIENT
Start: 2024-06-07 | End: 2024-06-07

## 2024-06-07 RX ORDER — LOSARTAN POTASSIUM 100 MG/1
50 TABLET, FILM COATED ORAL DAILY
Refills: 0 | Status: DISCONTINUED | OUTPATIENT
Start: 2024-06-07 | End: 2024-06-08

## 2024-06-07 RX ORDER — METOPROLOL TARTRATE 50 MG
1 TABLET ORAL
Refills: 0 | DISCHARGE

## 2024-06-07 RX ORDER — MORPHINE SULFATE 50 MG/1
2 CAPSULE, EXTENDED RELEASE ORAL ONCE
Refills: 0 | Status: DISCONTINUED | OUTPATIENT
Start: 2024-06-07 | End: 2024-06-07

## 2024-06-07 RX ORDER — ALPRAZOLAM 0.25 MG
1 TABLET ORAL
Refills: 0 | DISCHARGE

## 2024-06-07 RX ORDER — PANTOPRAZOLE SODIUM 20 MG/1
40 TABLET, DELAYED RELEASE ORAL
Refills: 0 | Status: DISCONTINUED | OUTPATIENT
Start: 2024-06-07 | End: 2024-06-08

## 2024-06-07 RX ORDER — COLCHICINE 0.6 MG
0.6 TABLET ORAL ONCE
Refills: 0 | Status: COMPLETED | OUTPATIENT
Start: 2024-06-07 | End: 2024-06-07

## 2024-06-07 RX ORDER — AMLODIPINE BESYLATE 2.5 MG/1
5 TABLET ORAL DAILY
Refills: 0 | Status: DISCONTINUED | OUTPATIENT
Start: 2024-06-07 | End: 2024-06-08

## 2024-06-07 RX ORDER — APIXABAN 2.5 MG/1
5 TABLET, FILM COATED ORAL EVERY 12 HOURS
Refills: 0 | Status: DISCONTINUED | OUTPATIENT
Start: 2024-06-07 | End: 2024-06-08

## 2024-06-07 RX ORDER — ATORVASTATIN CALCIUM 80 MG/1
1 TABLET, FILM COATED ORAL
Refills: 0 | DISCHARGE

## 2024-06-07 RX ORDER — METOPROLOL TARTRATE 50 MG
50 TABLET ORAL DAILY
Refills: 0 | Status: DISCONTINUED | OUTPATIENT
Start: 2024-06-07 | End: 2024-06-08

## 2024-06-07 RX ORDER — SUCRALFATE 1 G
1 TABLET ORAL ONCE
Refills: 0 | Status: COMPLETED | OUTPATIENT
Start: 2024-06-07 | End: 2024-06-07

## 2024-06-07 RX ORDER — OLMESARTAN MEDOXOMIL / AMLODIPINE BESYLATE / HYDROCHLOROTHIAZIDE 40; 10; 25 MG/1; MG/1; MG/1
0.5 TABLET, FILM COATED ORAL
Refills: 0 | DISCHARGE

## 2024-06-07 RX ORDER — ALPRAZOLAM 0.25 MG
1 TABLET ORAL
Refills: 0 | Status: DISCONTINUED | OUTPATIENT
Start: 2024-06-07 | End: 2024-06-08

## 2024-06-07 RX ADMIN — ATORVASTATIN CALCIUM 40 MILLIGRAM(S): 80 TABLET, FILM COATED ORAL at 21:12

## 2024-06-07 RX ADMIN — FAMOTIDINE 20 MILLIGRAM(S): 10 INJECTION INTRAVENOUS at 13:40

## 2024-06-07 RX ADMIN — Medication 1 GRAM(S): at 15:50

## 2024-06-07 RX ADMIN — Medication 400 MILLIGRAM(S): at 13:40

## 2024-06-07 RX ADMIN — Medication 1000 MILLIGRAM(S): at 16:04

## 2024-06-07 RX ADMIN — MORPHINE SULFATE 2 MILLIGRAM(S): 50 CAPSULE, EXTENDED RELEASE ORAL at 14:52

## 2024-06-07 RX ADMIN — MORPHINE SULFATE 2 MILLIGRAM(S): 50 CAPSULE, EXTENDED RELEASE ORAL at 16:04

## 2024-06-07 RX ADMIN — Medication 1 MILLIGRAM(S): at 21:12

## 2024-06-07 RX ADMIN — APIXABAN 5 MILLIGRAM(S): 2.5 TABLET, FILM COATED ORAL at 15:51

## 2024-06-07 RX ADMIN — Medication 0.6 MILLIGRAM(S): at 15:50

## 2024-06-07 NOTE — ASU PREOP CHECKLIST - NOTHING BY MOUTH SINCE
Patient Instructions by Brittani Miguel MD at 2019 11:00 AM     Author: Brittani Miguel MD Service: -- Author Type: Physician    Filed: 2019 11:24 AM Encounter Date: 2019 Status: Signed    : Brittani Miguel MD (Physician)         Patient Education   2019  Wt Readings from Last 1 Encounters:   09/06/19 15 lb 11.5 oz (7.13 kg) (52 %, Z= 0.04)*     * Growth percentiles are based on WHO (Girls, 0-2 years) data.       Acetaminophen Dosing Instructions  (May take every 4-6 hours)      WEIGHT   AGE Infant/Children's  160mg/5ml Children's   Chewable Tabs  80 mg each Issa Strength  Chewable Tabs  160 mg     Milliliter (ml) Soft Chew Tabs Chewable Tabs   6-11 lbs 0-3 months 1.25 ml     12-17 lbs 4-11 months 2.5 ml     18-23 lbs 12-23 months 3.75 ml     24-35 lbs 2-3 years 5 ml 2 tabs    36-47 lbs 4-5 years 7.5 ml 3 tabs    48-59 lbs 6-8 years 10 ml 4 tabs 2 tabs   60-71 lbs 9-10 years 12.5 ml 5 tabs 2.5 tabs   72-95 lbs 11 years 15 ml 6 tabs 3 tabs   96 lbs and over 12 years   4 tabs        Patient Education             [employer] Parent Handout   4 Month Visit  Here are some suggestions from [employer] experts that may be of value to your family.     How Your Family Is Doing    Take time for yourself.    Take time together with your partner.    Spend time alone with your other children.    Encourage your partner to help care for your baby.    Choose a mature, trained, and responsible  or caregiver.    You can talk with us about your  choices.    Hold, cuddle, talk to, and sing to your baby each day.    Massaging your infant may help your baby go to sleep more easily.    Get help if you and your partner are in conflict. Let us know. We can help.  Feeding Your Baby    Feed only breast milk or iron-fortified formula in the first 4-6 months.  If Breastfeeding    If you are still breastfeeding, thats great!    Plan for pumping and storage of breast milk.   If Formula  Feeding    Make sure to prepare, heat, and store the formula safely.    Hold your baby so you can look at each other while feeding.    Do not prop the bottle.    Do not give your baby a bottle in the crib.   Solid Food    You may begin to feed your baby solid food when your baby is ready.    Some of the signs your baby is ready for solids    Opens mouth for the spoon.    Sits with support.    Good head and neck control.    Interest in foods you eat.    Avoid foods that cause allergy--peanuts, tree nuts, fish, and shellfish.    Avoid feeding your baby too much by following the babys signs of fullness   Leaning back    Turning away    Ask us about programs like WIC that can help get food for you if you are breastfeeding and formula for your baby if you are formula feeding.  Safety    Use a rear-facing car safety seat in the back seat in all vehicles.    Always wear a seat belt and never drive after using alcohol or drugs.    Keep small objects and plastic bags away from your baby.    Keep a hand on your baby on any high surface from which she can fall and be hurt.    Prevent burns by setting your water heater so the temperature at the faucet is 120 F or lower.    Do not drink hot drinks when holding your baby.    Never leave your baby alone in bathwater, even in a bath seat or ring.    The kitchen is the most dangerous room. Dont let your baby crawl around there; use a playpen or high chair instead.    Do not use a baby walker.  Your Changing Baby    Keep routines for feeding, nap time, and bedtime.  Crib/Playpen    Put your baby to sleep on her back.    In a crib that meets current safety standards, with no drop-side rail and slats no more than 2 3/8 inches apart. Find more information on the Consumer Product Safety Commission Web site at www.cpsc.gov.  If your crib has a drop-side rail, keep it up and locked at all times. Contact the crib company to see if there is a device to keep the drop-side rail from falling  down   Keep soft objects and loose bedding such as comforters, pillows, bumper pads, and toys out of the crib.    Lower your babys mattress.    If using a mesh playpen, make sure the openings are less than 1/4 inch apart. Playtime    Learn what things your baby likes and does not like.    Encourage active play.    Offer mirrors, floor gyms, and colorful toys to hold.    Tummy time--put your baby on his tummy when awake and you can watch.    Promote quiet play.    Hold and talk with your baby.    Read to your baby often. Crying    Give your baby a pacifier or his fingers or thumb to suck when crying.  Healthy Teeth    Go to your own dentist twice yearly. It is important to keep your teeth healthy so that you dont pass bacteria that causes tooth decay on to your baby.    Do not share spoons or cups with your baby or use your mouth to clean the babys pacifier.    Use a cold teething ring if your baby has sore gums with teething.  What to Expect at Your Babys 6 Month Visit  We will talk about    Introducing solid food    Getting help with your baby    Home and car safety    Brushing your babys teeth    Reading to and teaching your baby  _______________________________________  Poison Help: 1-913.789.2144  Child safety seat inspection: 9-700-QINBGJMTB; seatcheck.org              06-Jun-2024 19:00

## 2024-06-07 NOTE — DISCHARGE NOTE PROVIDER - NSDCCPCAREPLAN_GEN_ALL_CORE_FT
PRINCIPAL DISCHARGE DIAGNOSIS  Diagnosis: Atrial fibrillation  Assessment and Plan of Treatment:      PRINCIPAL DISCHARGE DIAGNOSIS  Diagnosis: Atrial fibrillation  Assessment and Plan of Treatment: s/p ablation

## 2024-06-07 NOTE — DISCHARGE NOTE PROVIDER - NSDCCPTREATMENT_GEN_ALL_CORE_FT
PRINCIPAL PROCEDURE  Procedure: Intracardiac catheter ablation for atrial fibrillation  Findings and Treatment: -Cont Eliquis for  thromboembolic prophylaxis  -Cont BB  - Please start taking pantoprazole 40 mg daily for 30 days   - You may shower today.  - Do not drive or operate heavy machinery for 3 days.  - Do not submerge in water (example: baths, swimming) for 1 week.  - No squatting, exertional activities, or lifting anything > 10 lbs for 1 week.  - Any sudden swelling, redness, fever, discharge, or severe pain, call the Electrophysiology Office at [phone].

## 2024-06-07 NOTE — DISCHARGE NOTE PROVIDER - HOSPITAL COURSE
68-year-old male with PMHx of HTN, DLD, aortic root dilatation s/p aortic root replacement without aortic valve replacement, family history of CAD, AF, presents for elective atrial fibrillation ablation. Echo (01/2024) showed  Normal EF, normal LV size, severe LAE.   On 6/7/24,  patient underwent successful atrial fibrillation ablation.  Patient was monitored overnight. On POD 1 patient remains HD stable with no complaints. Patient remains in NSR with no arrhythmias noted on tele. Examination of b/l common femoral venous access sites  reveal a Clear and dry area with no hematoma or erythema.  Patient should continue Eliquis for thromboembolic prophylaxis. Protonix 40 mg daily for 30 days.  Patient is being DC home in stable condition.              68-year-old male with PMHx of HTN, DLD, aortic root dilatation s/p aortic root replacement without aortic valve replacement, family history of CAD, AF, presents for elective atrial fibrillation ablation. Echo (01/2024) showed  Normal EF, normal LV size, severe LAE.  On 6/7/24,  patient underwent successful atrial fibrillation ablation.  Patient was monitored overnight. On POD 1 patient remains HD stable with no complaints. Patient remains in NSR with no arrhythmias noted on tele. Examination of b/l common femoral venous access sites reveal a Clear and dry area with no hematoma or erythema.  Patient should continue Eliquis for thromboembolic prophylaxis, and Protonix 40 mg daily for 30 days.  Patient is being DC home in stable condition.

## 2024-06-07 NOTE — PATIENT PROFILE ADULT - FUNCTIONAL ASSESSMENT - DAILY ACTIVITY SCORE.
History   Chief Complaint:  Fall     HPI   Galileo Vieira is a 55 year old male with a history of high cholesterol who presents via EMS for evaluation of neck pain and head injury. 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 Pt was coming down the stairs and slipped on the ice.  Patient is unclear if he lost consciousness and is not anticoagulated. He was complaining of neck pain so he was collared by EMS and brought to the emergency room for further assessment.  Patient also states he has ongoing non-radiating, non-exertional or positional left-sided chest pain. Patient has no clear extremity injury.    08/24/20 Echo Exercise Stress Test   The patient exhibited hypertension at rest.  A low to moderate workload was achieved.  There was no chest pain or significant ST changes with exercise.  This was a normal stress echocardiogram with no evidence of stress-induced  ischemia.  Normal resting wall motion and no stress-induced wall motion abnormality.  Right ventricular systolic pressure could not be approximated due to  inadequate tricuspid regurgitation.  There was a borderline hypertensive BP response to exercise.    Review of Systems   Cardiovascular: Positive for chest pain.   Musculoskeletal: Positive for neck pain. Negative for arthralgias.   Neurological: Negative for syncope and numbness.        Head injury   Hematological: Does not bruise/bleed easily.   All other systems reviewed and are negative.      Allergies:  The patient has no known allergies.     Medications:  Celebrex  Prilosec  Pravachol  Zantac  Zoloft  Depo-testosterone    Past Medical History:    Concussion  GERD  Hypercholesterolemia   Depression  Anxiety  Hypogonadism   Obstructive sleep apnea  Hyperlipidemia     Past Surgical History:    Cholecystectomy   Colonoscopy x2  Left knee arthroscopy   Bilateral inguinal hernia surgery   Vasectomy      Family History:    Crohn's disease -  father  Cardiovascular - father   Depression - father   Triple bypass - father   Seizures - father   Sleep apnea - father   CABG - father   Cerebrovascular disease - father   Hyperlipidemia - brother   Diabetes - brother   CAD - brother     Social History:  The patient arrived to the ED Alone via EMS.  PCP: Ramona Ann Aaseby-Aguilera  Smoking Status: Former Smoker  Smokeless Tobacco: Never Used  Alcohol Use: Positive    Physical Exam     Patient Vitals for the past 24 hrs:   BP Temp Temp src Pulse Resp SpO2   01/13/21 0711 (!) 157/97 -- -- -- -- --   01/13/21 0708 -- 98.4  F (36.9  C) Oral 99 -- 98 %   01/13/21 0707 -- -- -- -- 20 --       Physical Exam  Vitals signs and nursing note reviewed.   Constitutional:       Appearance: He is obese.   HENT:      Head: Normocephalic.      Left Ear: Tympanic membrane normal.      Nose: Nose normal.      Mouth/Throat:      Mouth: Mucous membranes are moist.   Eyes:      Pupils: Pupils are equal, round, and reactive to light.   Neck:      Musculoskeletal: Muscular tenderness present.   Cardiovascular:      Rate and Rhythm: Normal rate and regular rhythm.   Pulmonary:      Effort: Pulmonary effort is normal.   Abdominal:      Palpations: Abdomen is soft.   Musculoskeletal: Normal range of motion.   Skin:     General: Skin is warm.      Capillary Refill: Capillary refill takes less than 2 seconds.   Neurological:      General: No focal deficit present.      Mental Status: He is alert and oriented to person, place, and time.   Psychiatric:         Mood and Affect: Mood normal.           Emergency Department Course     ECG:  ECG taken at 0716, ECG read at 0734  Normal sinus rhythm  Normal ECG  When compared to prior ECG from 11/04/19: no significant change was found.  Rate 93 bpm. VT interval 140 ms. QRS duration 92 ms. QT/QTc 352/437 ms. P-R-T axes 30 -17 20.    Imaging:  Cervical spine CT without Contrast  1. No evidence of acute fracture or subluxation in the cervical spine.    2. Degenerative changes in the cervical spine as described above.   Degenerative changes appear progressed since previous MR 12/20/2013   noting differences in technique.     Head CT without Contrast  No evidence of acute intracranial hemorrhage, mass, or   herniation.     XR Chest Port 1 View  Unremarkable single view of the chest.     Reading per radiology.    Laboratory:  CBC: WBC: 6.3, HGB: 15.4, PLT: 220    BMP: Glucose 122 (H), o/w WNL (Creatinine: 1.04)    Troponin (0711): 0.029  Troponin (0912): 0.027    Emergency Department Course:    Reviewed:  I reviewed the patient's nursing notes, vitals and past medical history.     Assessments:  0706 I performed an exam of the patient in room ED11 as documented above.  I updated the patient on results and discussed plan of care.    Interventions:  0719 Toradol, 15 mg, IV    Disposition:  The patient was discharged to home.     Impression & Plan   Medical Decision Making:  Galileo Vieira is a 55 year old male who presents to the emergency department today for evaluation of fall and head and neck injury at work as well as chest pain.  Patient fell on ice due to occipital pain as well as base of the skull pain CT head and C-spine were performed that showed no subdural subarachnoid or C-spine injury.  Patient has a complaint of what seems to be more chronic chest pain EKG is negative and troponin initially was indeterminate and therefore repeat is also negative.  Patient is already had a stress test that there is an extensive family history including 2 brothers that have had coronary artery disease he denies his wife is worse with exertion or activity.  Ultimately recommend follow-up with cardiology to discuss the utility of CT angiography for now no need for admission and patient was discharged in stable condition.      Diagnosis:    ICD-10-CM    1. Atypical chest pain  R07.89    2. Closed head injury, initial encounter  S09.90XA    3. Strain of neck muscle, initial  encounter  S16.1XXA        Scribe Disclosure:  I, Modesta Horton, am serving as a scribe at 7:06 AM on 1/13/2021 to document services personally performed by Kwasi Jade MD based on my observations and the provider's statements to me.     This note was completed in part using Dragon voice recognition software. Although reviewed after completion, some word and grammatical errors may occur.      Kwais Jade MD  01/16/21 0023       Kwasi Jade MD  04/12/21 0158     24

## 2024-06-07 NOTE — DISCHARGE NOTE PROVIDER - CARE PROVIDER_API CALL
Mile Fermin  Cardiac Electrophysiology  [address] [place] [address]  Phone: [phone]  Fax: [phone]  Follow Up Time: 1 month

## 2024-06-07 NOTE — ASU PATIENT PROFILE, ADULT - FALL HARM RISK - HARM RISK INTERVENTIONS

## 2024-06-07 NOTE — PATIENT PROFILE ADULT - FALL HARM RISK - UNIVERSAL INTERVENTIONS
Bed in lowest position, wheels locked, appropriate side rails in place/Call bell, personal items and telephone in reach/Instruct patient to call for assistance before getting out of bed or chair/Non-slip footwear when patient is out of bed/[place] to call system/Physically safe environment - no spills, clutter or unnecessary equipment/Purposeful Proactive Rounding/Room/bathroom lighting operational, light cord in reach

## 2024-06-07 NOTE — DISCHARGE NOTE PROVIDER - NSDCMRMEDTOKEN_GEN_ALL_CORE_FT
atorvastatin 40 mg oral tablet: 1 tab(s) orally once a day (at bedtime)  Eliquis 5 mg oral tablet: 1 tab(s) orally 2 times a day  metoprolol succinate 50 mg oral tablet, extended release: 1 tab(s) orally once a day  omeprazole 40 mg oral delayed release capsule: 1 cap(s) orally once a day PRN  Tribenzor 20 mg-5 mg-12.5 mg oral tablet: 0.5 tab(s) orally once a day  Xanax 1 mg oral tablet: 1 tab(s) orally 2 times a day as needed for  anxiety   atorvastatin 40 mg oral tablet: 1 tab(s) orally once a day (at bedtime)  Eliquis 5 mg oral tablet: 1 tab(s) orally 2 times a day  metoprolol succinate 50 mg oral tablet, extended release: 1 tab(s) orally once a day  omeprazole 40 mg oral delayed release capsule: 1 cap(s) orally once a day PRN  Protonix 40 mg oral delayed release tablet: 1 tab(s) orally once a day  Tribenzor 20 mg-5 mg-12.5 mg oral tablet: 0.5 tab(s) orally once a day  Xanax 1 mg oral tablet: 1 tab(s) orally 2 times a day as needed for  anxiety   amLODIPine 5 mg oral tablet: 1 tab(s) orally once a day  atorvastatin 40 mg oral tablet: 1 tab(s) orally once a day (at bedtime)  Eliquis 5 mg oral tablet: 1 tab(s) orally 2 times a day  hydroCHLOROthiazide 12.5 mg oral capsule: 1 cap(s) orally once a day  losartan 50 mg oral tablet: 1 tab(s) orally once a day  metoprolol succinate 50 mg oral tablet, extended release: 1 tab(s) orally once a day  Protonix 40 mg oral delayed release tablet: 1 tab(s) orally once a day  Tribenzor 20 mg-5 mg-12.5 mg oral tablet: 0.5 tab(s) orally once a day  Xanax 1 mg oral tablet: 1 tab(s) orally 2 times a day as needed for  anxiety   amLODIPine 5 mg oral tablet: 1 tab(s) orally once a day  atorvastatin 40 mg oral tablet: 1 tab(s) orally once a day (at bedtime)  Eliquis 5 mg oral tablet: 1 tab(s) orally 2 times a day  metoprolol succinate 50 mg oral tablet, extended release: 1 tab(s) orally once a day  Protonix 40 mg oral delayed release tablet: 1 tab(s) orally once a day  Tribenzor 20 mg-5 mg-12.5 mg oral tablet: 0.5 tab(s) orally once a day  Xanax 1 mg oral tablet: 1 tab(s) orally 2 times a day as needed for  anxiety

## 2024-06-08 ENCOUNTER — TRANSCRIPTION ENCOUNTER (OUTPATIENT)
Age: 69
End: 2024-06-08

## 2024-06-08 VITALS
OXYGEN SATURATION: 93 % | HEART RATE: 69 BPM | DIASTOLIC BLOOD PRESSURE: 66 MMHG | RESPIRATION RATE: 18 BRPM | TEMPERATURE: 98 F | SYSTOLIC BLOOD PRESSURE: 140 MMHG

## 2024-06-08 PROCEDURE — 99232 SBSQ HOSP IP/OBS MODERATE 35: CPT

## 2024-06-08 PROCEDURE — 93010 ELECTROCARDIOGRAM REPORT: CPT

## 2024-06-08 PROCEDURE — 99238 HOSP IP/OBS DSCHRG MGMT 30/<: CPT

## 2024-06-08 RX ORDER — AMLODIPINE BESYLATE 2.5 MG/1
1 TABLET ORAL
Qty: 0 | Refills: 0 | DISCHARGE
Start: 2024-06-08

## 2024-06-08 RX ORDER — FAMOTIDINE 10 MG/ML
20 INJECTION INTRAVENOUS
Refills: 0 | Status: DISCONTINUED | OUTPATIENT
Start: 2024-06-08 | End: 2024-06-08

## 2024-06-08 RX ORDER — HYDROCHLOROTHIAZIDE 25 MG
1 TABLET ORAL
Qty: 0 | Refills: 0 | DISCHARGE
Start: 2024-06-08

## 2024-06-08 RX ORDER — OMEPRAZOLE 10 MG/1
1 CAPSULE, DELAYED RELEASE ORAL
Refills: 0 | DISCHARGE

## 2024-06-08 RX ORDER — LOSARTAN POTASSIUM 100 MG/1
1 TABLET, FILM COATED ORAL
Qty: 0 | Refills: 0 | DISCHARGE
Start: 2024-06-08

## 2024-06-08 RX ORDER — PANTOPRAZOLE SODIUM 20 MG/1
1 TABLET, DELAYED RELEASE ORAL
Qty: 30 | Refills: 0
Start: 2024-06-08 | End: 2024-07-07

## 2024-06-08 RX ADMIN — Medication 50 MILLIGRAM(S): at 06:19

## 2024-06-08 RX ADMIN — FAMOTIDINE 20 MILLIGRAM(S): 10 INJECTION INTRAVENOUS at 01:13

## 2024-06-08 RX ADMIN — PANTOPRAZOLE SODIUM 40 MILLIGRAM(S): 20 TABLET, DELAYED RELEASE ORAL at 06:18

## 2024-06-08 RX ADMIN — APIXABAN 5 MILLIGRAM(S): 2.5 TABLET, FILM COATED ORAL at 06:18

## 2024-06-08 NOTE — DISCHARGE NOTE NURSING/CASE MANAGEMENT/SOCIAL WORK - NSFLUVACAGEDISCH_IMM_ALL_CORE
-- DO NOT REPLY / DO NOT REPLY ALL --  -- Message is from Engagement Center Operations (ECO) --    General Patient Message: Patients son sarath is calling his mother needs the stent removed that was placed 12/1/2023 please call him to assist with scheduling      Caller Information         Type Contact Phone/Fax    03/18/2024 05:09 PM CDT Phone (Incoming) Sarath (Son) [phone]          Alternative phone number: none     Can a detailed message be left? Yes    Message Turnaround:     Is it Working Hours? No - After Hours/Weekend/Holiday     Did the caller agree that this message can wait until the office reopens in the morning? YES - The Message Can Wait - Send a message to the provider's clinical support pool                     
Adult

## 2024-06-08 NOTE — DISCHARGE NOTE NURSING/CASE MANAGEMENT/SOCIAL WORK - PATIENT PORTAL LINK FT
You can access the FollowMyHealth Patient Portal offered by [facility] by registering at the following website: [email or web address]. By joining [facility]’s FollowMyHealth portal, you will also be able to view your health information using other applications (apps) compatible with our system.

## 2024-06-08 NOTE — PROGRESS NOTE ADULT - SUBJECTIVE AND OBJECTIVE BOX
INTERVAL HPI/OVERNIGHT EVENTS:    Patient s/p afib ablation.   No events over night  Patient in NSR    MEDICATIONS  (STANDING):  ALPRAZolam 1 milliGRAM(s) Oral two times a day  amLODIPine   Tablet 5 milliGRAM(s) Oral daily  apixaban 5 milliGRAM(s) Oral every 12 hours  atorvastatin 40 milliGRAM(s) Oral at bedtime  famotidine    Tablet 20 milliGRAM(s) Oral two times a day  hydrochlorothiazide 12.5 milliGRAM(s) Oral daily  losartan 50 milliGRAM(s) Oral daily  metoprolol succinate ER 50 milliGRAM(s) Oral daily  pantoprazole    Tablet 40 milliGRAM(s) Oral before breakfast    MEDICATIONS  (PRN):      Allergies    No Known Allergies    Intolerances      Vital Signs Last 24 Hrs  T(C): 36.6 (08 Jun 2024 07:16), Max: 36.7 (07 Jun 2024 23:55)  T(F): 97.9 (08 Jun 2024 07:16), Max: 98.1 (07 Jun 2024 23:55)  HR: 69 (08 Jun 2024 07:16) (59 - 79)  BP: 140/66 (08 Jun 2024 07:16) (134/61 - 173/78)  BP(mean): 95 (08 Jun 2024 07:16) (88 - 120)  RR: 18 (08 Jun 2024 07:16) (15 - 20)  SpO2: 93% (08 Jun 2024 07:16) (93% - 100%)    Parameters below as of 08 Jun 2024 07:16  Patient On (Oxygen Delivery Method): room air      HEENT SYBIL EOMI  Lung CTAB  Heart RRR normal S1 S2  abd +BS soft  groins No hematoma, no bleeding  Ext no C/C/E                   INTERVAL HPI/OVERNIGHT EVENTS:  Patient s/p afib ablation.   No events over night  Patient in NSR    MEDICATIONS  (STANDING):  ALPRAZolam 1 milliGRAM(s) Oral two times a day  amLODIPine   Tablet 5 milliGRAM(s) Oral daily  apixaban 5 milliGRAM(s) Oral every 12 hours  atorvastatin 40 milliGRAM(s) Oral at bedtime  famotidine    Tablet 20 milliGRAM(s) Oral two times a day  hydrochlorothiazide 12.5 milliGRAM(s) Oral daily  losartan 50 milliGRAM(s) Oral daily  metoprolol succinate ER 50 milliGRAM(s) Oral daily  pantoprazole    Tablet 40 milliGRAM(s) Oral before breakfast    MEDICATIONS  (PRN):      Allergies    No Known Allergies    Intolerances      Vital Signs Last 24 Hrs  T(C): 36.6 (08 Jun 2024 07:16), Max: 36.7 (07 Jun 2024 23:55)  T(F): 97.9 (08 Jun 2024 07:16), Max: 98.1 (07 Jun 2024 23:55)  HR: 69 (08 Jun 2024 07:16) (59 - 79)  BP: 140/66 (08 Jun 2024 07:16) (134/61 - 173/78)  BP(mean): 95 (08 Jun 2024 07:16) (88 - 120)  RR: 18 (08 Jun 2024 07:16) (15 - 20)  SpO2: 93% (08 Jun 2024 07:16) (93% - 100%)    Parameters below as of 08 Jun 2024 07:16  Patient On (Oxygen Delivery Method): room air      HEENT SYBIL EOMI  Lung CTAB  Heart RRR normal S1 S2  abd +BS soft  groins No hematoma, no bleeding  Ext no C/C/E

## 2024-06-08 NOTE — PROGRESS NOTE ADULT - ASSESSMENT
A/P  Patient S/P afib Ablation  Paient is doing well  - continue Eliquis   - protonix 40 mg daily x 30 days  - No heavy lifting x 1 week  - Pt may shower today  - No bath/swimming x 1 week  - ok to discharge home today  - follow up with EP in 1 month

## 2024-06-10 PROBLEM — Z86.69 PERSONAL HISTORY OF OTHER DISEASES OF THE NERVOUS SYSTEM AND SENSE ORGANS: Chronic | Status: ACTIVE | Noted: 2024-05-23

## 2024-06-10 PROBLEM — I77.810 THORACIC AORTIC ECTASIA: Chronic | Status: ACTIVE | Noted: 2024-05-23

## 2024-06-10 PROBLEM — I10 ESSENTIAL (PRIMARY) HYPERTENSION: Chronic | Status: ACTIVE | Noted: 2024-05-23

## 2024-06-10 PROBLEM — I48.91 UNSPECIFIED ATRIAL FIBRILLATION: Chronic | Status: ACTIVE | Noted: 2024-05-23

## 2024-06-13 DIAGNOSIS — I10 ESSENTIAL (PRIMARY) HYPERTENSION: ICD-10-CM

## 2024-06-13 DIAGNOSIS — I48.3 TYPICAL ATRIAL FLUTTER: ICD-10-CM

## 2024-06-13 DIAGNOSIS — I48.0 PAROXYSMAL ATRIAL FIBRILLATION: ICD-10-CM

## 2024-06-13 DIAGNOSIS — Z98.1 ARTHRODESIS STATUS: ICD-10-CM

## 2024-06-13 DIAGNOSIS — Z82.49 FAMILY HISTORY OF ISCHEMIC HEART DISEASE AND OTHER DISEASES OF THE CIRCULATORY SYSTEM: ICD-10-CM

## 2024-07-11 ENCOUNTER — APPOINTMENT (OUTPATIENT)
Dept: ELECTROPHYSIOLOGY | Facility: CLINIC | Age: 69
End: 2024-07-11

## 2024-07-11 VITALS
SYSTOLIC BLOOD PRESSURE: 120 MMHG | BODY MASS INDEX: 30.61 KG/M2 | TEMPERATURE: 97.6 F | RESPIRATION RATE: 17 BRPM | DIASTOLIC BLOOD PRESSURE: 74 MMHG | HEART RATE: 62 BPM | HEIGHT: 72 IN | WEIGHT: 226 LBS

## 2024-07-11 PROCEDURE — 93000 ELECTROCARDIOGRAM COMPLETE: CPT

## 2024-07-11 PROCEDURE — 99214 OFFICE O/P EST MOD 30 MIN: CPT

## 2024-07-11 PROCEDURE — G2211 COMPLEX E/M VISIT ADD ON: CPT

## 2024-07-11 RX ORDER — AMLODIPINE AND OLMESARTAN MEDOXOMIL 10; 20 MG/1; MG/1
10-20 TABLET ORAL
Refills: 0 | Status: ACTIVE | COMMUNITY

## 2024-12-31 NOTE — ASU PATIENT PROFILE, ADULT - URINARY CATHETER
[facility]  CARDIOLOGY OFFICE NOTE         No chief complaint on file.      HPI/ASSESSMENT/PLAN:   Josemanuel Ma Jr. is a 79 year old male transfer of care from Dr. Heber Vega for CAD s/p 3V CABG 10/2015 with Dr. Lindsay, hypertension, hyperlipidemia, carotid stenosis s/p left CEA 8/2014, CVA on long-term anticoagulation with warfarin, DM, and tobacco use.  Continue 81 mg aspirin.Since his last visit he reports No chest pain or discomfort, no symptoms of congestive heart failure or dysrhythmia, no shortness of breath, palpitations, dizziness, syncope, or leg edema.    The patient was advised to stop tobacco use.  The risks were discussed with the patient. > 3 min    Blood pressure: Compliant with medication/ diet. No side effects were reported, no change in vision or TIA symptoms.  Continue Procardia XL 60    Lipids: compliant with medication / diet and no myalgias.  LDL 78 continue Lipitor    This patient is stable from a cardiac standpoint.  The patient was advised to increase aerobic exercise/activity.  Follow a healthy diet and monitor blood pressure. The patients cardiovascular history, work up, and labs were reviewed and discussed.         ALLERGIES:   Allergen Reactions    Lisinopril Other (See Comments)     Elevated potassium level    Morphine Nausea & Vomiting       MEDICATIONS   Current Outpatient Medications   Medication Sig Dispense Refill    mirabegron ER (Myrbetriq) 50 MG 24 hr tablet TAKE 1 TABLET BY MOUTH DAILY 90 tablet 3    warfarin (COUMADIN) 2.5 MG tablet Take 1.25-2.5 mg by mouth as directed. Take 2.5 mg (1 tablet) by mouth on Tuesdays and Saturdays as directed. Take 1.25 mg (one-half tablet) by mouth on all other days of the week as directed.      montelukast (SINGULAIR) 10 MG tablet TAKE 1 TABLET BY MOUTH EVERY NIGHT 90 tablet 1    fluticasone (FLONASE) 50 MCG/ACT nasal spray Spray 2 sprays in each nostril nightly. (Patient taking differently: Spray 2 sprays in each  nostril nightly as needed (for congestion or allergy).) 48 g 1    clobetasol (TEMOVATE) 0.05 % ointment Apply 1 Application topically 2 times daily as needed (rash on hands). Not using this currently, will change order to PRN (Patient not taking: Reported on 12/18/2024)      empagliflozin (Jardiance) 10 MG tablet Take 1 tablet by mouth daily (before breakfast). 90 tablet 3    linaGLIPtin (Tradjenta) 5 MG tablet Take 1 tablet by mouth daily. 90 tablet 3    albuterol 108 (90 Base) MCG/ACT inhaler Inhale 2 puffs into the lungs every 4 hours as needed for Shortness of Breath or Wheezing. (Patient not taking: Reported on 12/18/2024) 18 g 11    fluticasone-umeclidin-vilanterol (Trelegy Ellipta) 200-62.5-25 MCG/ACT inhaler Inhale 1 puff into the lungs daily. 180 each 3    finasteride (PROSCAR) 5 MG tablet TAKE 1 TABLET BY MOUTH DAILY 90 tablet 3    FLUoxetine (PROzac) 10 MG capsule Take 1 capsule by mouth daily. 90 capsule 1    NIFEdipine XL (PROCARDIA XL) 60 MG 24 hr tablet Take 1 tablet by mouth daily. 90 tablet 3    fenofibrate (TRICOR) 145 MG tablet TAKE 1 TABLET BY MOUTH DAILY 90 tablet 3    atorvastatin (LIPITOR) 80 MG tablet Take 1 tablet by mouth daily. 90 tablet 3    Accu-Chek Maria Teresa Plus test strip TERST BLOOD SUGAR ONCE DAILY AS DIRECTED 100 strip 1    cetirizine (ZyrTEC) 10 MG tablet Take 1 tablet by mouth daily. 90 tablet 1    tamsulosin (FLOMAX) 0.4 MG Cap TAKE 1 CAPSULE BY MOUTH IN THE MORNING AND IN THE EVENING 180 capsule 3    SOFTCLIX LANCETS Misc TEST BLOOD SUGAR ONCE DAILY  DIAGNOSIS E11.22 100 each 3    Omega-3 Fatty Acids (FISH OIL) 1200 MG capsule Take 1,200 mg by mouth daily.       aspirin 81 MG chewable tablet Chew 2 tablets by mouth daily. 60 tablet 2     No current facility-administered medications for this visit.       MEDICAL HISTORY    Past Medical History:   Diagnosis Date    Abnormal EKG 08/26/2015    Allergy     Arthritis     Asthma (CMD)     Bell's palsy     Bronchitis     as a child      Carotid stenosis 08/2014    dizziness, weakness and blurred vision- hospitalized 6/2014- symptoms resolved     CKD (chronic kidney disease), stage III  (CMD)     COPD (chronic obstructive pulmonary disease)  (CMD)     Coronary artery disease     CVA (cerebrovascular accident)  (CMD)     left side- no residual problems x2 TIA    Depression     Diarrhea     secondary to meds     Diverticulitis     DM2 (diabetes mellitus, type 2)  (CMD)     Enlarged prostate with lower urinary tract symptoms (LUTS)     Essential (primary) hypertension     Failed moderate sedation during procedure     oxygen level dropped during his total hip replacement.    Failed moderate sedation during procedure     woke up during colonscopy    Fracture     left leg- tx with cast    History of alcohol abuse     last drink approx 1989    History of tobacco use     Hypercholesterolemia     Parotid tumor     s/p surgical intervention     Pneumonia     as a child \"many times\"    PVD (peripheral vascular disease) with claudication (CMD)     S/P CABG x 3 10/08/2015    Urinary tract infection         SURGICAL HISTORY   Past Surgical History:   Procedure Laterality Date    AORTA BIFEMORAL ANGIOGRAM/POSSIBLE PTA/POSSIBLE STENT - CV  02/15/2021    CARDIAC SURGERY  2015     3V cabg 2015    CAROTID ANGIOPLASTY Left 2014    CARPAL TUNNEL RELEASE      Carpal Tunnel; bilateral    COLONOSCOPY  2016    repeat in 5 years.    COLONOSCOPY  2016    COLONOSCOPY  10/04/2018    EXC PAROTD LESN,LATER LOBE  01/23/2012    JOINT REPLACEMENT      right total hip replacement     RIGHT HEART CATH  2015    SERVICE TO GASTROENTEROLOGY  04/09/2010    EGD with bx     SERVICE TO GASTROENTEROLOGY  06/13/2011    colonoscopy with bx    TONSILLECTOMY AND ADENOIDECTOMY      UROLIFT TRANSPROSTATIC IMPLANT PROCEDURE  04/27/2022        SOCIAL HISTORY   Social History     Tobacco Use    Smoking status: Every Day     Current packs/day: 0.50     Average packs/day: 0.5 packs/day for 30.0 years  (15.0 ttl pk-yrs)     Types: Cigarettes     Passive exposure: Current    Smokeless tobacco: Never    Tobacco comments:     10 cigarettes per day    Vaping Use    Vaping status: never used   Substance Use Topics    Alcohol use: Not Currently     Alcohol/week: 0.0 standard drinks of alcohol     Comment: last drink approx -was a daily heavy  drinker at that time     Drug use: No         FAMILY HISTORY   Family History   Problem Relation Age of Onset    Cancer Father          at age 69 of 3 types of cancer-lung ,skin and ?     Dementia/Alzheimers Mother         Altzheimer's     Diabetes Mother         borderline     Diabetes Sister     Obesity Sister         500#    Early death Brother 26        auto accident     Patient is unaware of any medical problems Son     Patient is unaware of any medical problems Daughter     Patient is unaware of any medical problems Daughter         REVIEW OF SYSTEMS      General / Constitutional: denies fever     Integumentary: denies rash    Eyes: denies blurred vision or change in vision   ENT:  denies hearing loss or ringing  Respiratory:  no  hemoptysis, cough or sputum   Cardiovascular: denies chest pain, pressure, PND, orthopnea ,syncope, presyncope, edema, claudication    Gastrointestinal: denies hematochezia or significant change in bowel habits   Musculoskeletal:  denies gait change or weakness    Neurological:  denies recent stroke or TIA    Psychiatric:  oriented to person, place, and time, affect appropriate, no difficulties with speech or language    Endocrine:  denies heat / cold intolerance    Hematological / Immunologic:  denies easy bruising      PHYSICAL EXAMINATION      There were no vitals taken for this visit.    Constitutional:  well developed, well nourished, in no acute distress    Skin:  warm and dry to touch    HEENT: normocephalic atraumatic, pupils equal round and reactive, hearing unchanged, no JVD, no bruits , No thyromegaly or mass     Respiratory:    trachea midline , decreased flow clear to auscultation, respiratory effort unchanged    Cardiac:  regular rhythm, S1 normal, S2 normal, no S3 or S4, no edema    Abdomen:  abdomen benign to palpation, no hernia     Extremities & Back:  normal muscle strength and tone, no clubbing present    Neurological:  oriented to person, place, and time     Psychiatric: affect appropriate, memory unchanged        VISIT DIAGNOSIS:  No diagnosis found.     ORDERS PLACED IN THIS ENCOUNTER:  No orders of the defined types were placed in this encounter.               Pertinent Reviewed:    Echo:   Results for orders placed or performed during the hospital encounter of 06/06/22   Echo M-mode/2D/Doppler (Routine)   Result Value Ref Range    Left Ventricular Internal Dimension in Diastole 4.798 cm    Left Internal Dimenson in Systole 2.966 cm    LV outflow tract 2.131 cm    Tricuspid Valve Peak Regurgitation Velocity 2.9 m/s    LVOT VTI 28.165 cm    PV Peak Velocity 1.0 m/s    MV Peak E Velocity 1.3 m/s    MV Peak A Velocity 1.6 m/s    E Wave Decelaration Time 0.253 s    MV E Tissue Isidoro Med 0.0 m/s    MV E Tissue Isidoro Lat 0.0 m/s    DOP Calc LVOT Peak Isidoro 1.2 m/s    AV Peak Velocity 2.5 m/s    Tricuspid valve annular peak velocity 0.1 m/s    TV Estimated Right Arterial Pressure 3 mmHg    MV E Wave Isidoro/E Tissue Isidoro Med 32.158 unitless    Aortic Valve Area 1.889 cmÂ²    Est Right Vent Systolic Pressure by Tricuspid Regurgitation Jet 41.58 mmHg    AV Mean Gradient 16.309 mmHg    AV Peak Gradient 26.03 mmHg    ANA LVOT Peak Gradient 3.033 mmHg    Ejection Fraction 63 %    Interventricular Septum in End Diastole 1.192 cm    LV end diastolic posterior wall thickness 2D 0.661 cm       Holter Monitor:   No results found. However, due to the size of the patient record, not all encounters were searched. Please check Results Review for a complete set of results.    MoMe MCT:   No results found. However, due to the size of the patient record, not all  encounters were searched. Please check Results Review for a complete set of results.    Stress Test:  No results found. However, due to the size of the patient record, not all encounters were searched. Please check Results Review for a complete set of results.    NM Myocardial Perfusion SPECT:  Results for orders placed or performed during the hospital encounter of 04/26/22   NM Myocardial Perf Rest of Stress Mult    Narrative    Josemanuel Ma Jr. is a 76 year old male who underwent risk   stratification for myocardial ischemia with pharmacological and low level   exercise myocardial perfusion imaging.     The patient tolerated a 0.4 mg infusion of regadenoson without heart   block, hypotension, bronchospasm, or tachycardia.  The patient reported no   symptoms with regadenoson injection.       The test was terminated secondary to end of protocol.  There were no   significant electrocardiographic changes.     MYOCARDIAL PERFUSION IMAGING:  Resting tetrofosmin dose:  10.5 millicuries  Stress tetrofosmin dose:  32.3 millicuries.    Image quality:  Fair, there is some minor motion artifact on both sets of   images.     Tomographic images revealed fairly homogenous radiotracer uptake   throughout the entire left ventricular myocardium on both the resting and   stress studies.  No focal resting or stress-induced reversible perfusion   defects were seen.      The sum stress score for myocardial ischemia is 2.  The summed difference   score is 2.    TID:  0.94     Gated study reveals normal left ventricular cavity size and systolic   function without wall motion abnormalities;  LVEF 54 % at rest and 57 %   following stress.     SUMMARY OF FINDINGS:  1.  No evidence of significant myocardial ischemia or prior infarction   seen on perfusion imaging.  2.  Normal left ventricular cavity size and systolic function  3.  Cardiac risk assessment:  Peng Vega DO, FACC         Sleep Study:  No results found.  However, due to the size of the patient record, not all encounters were searched. Please check Results Review for a complete set of results.    CT Angio Abd Aorta Iliofem w/ Leg Runoff:  Results for orders placed or performed during the hospital encounter of 11/12/20   CT Angio Abd Aorta Iliofem w Leg Runoff    Narrative    EXAM: CT ANGIO ABD AORTA BILAT ILIOFEM W LEG RUNOFF W CONTRAST    HISTORY: Peripheral vascular disease    TECHNIQUE: Helical CT angiogram of the abdomen, pelvis, and lower  extremities was performed with contrast per run-off protocol. Triplanar,  3D, and MIP reconstructions were reviewed. 150 mL Isovue-370 was  administered.    COMPARISON: CT abdomen pelvis with IV contrast 3/17/2011, bilateral lower  extremity arterial duplex 10/14/2020    FINDINGS:     Aorta: No aortic aneurysm. There are moderate circumferential  atherosclerotic calcified plaques. There are mild soft plaque seen  throughout the abdominal aorta.  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        SMA: There is compensatory enlargement of the superior mesenteric  artery with hypertrophied collaterals supplying retrograde flow through the  GDA and pancreaticoduodenal arteries.        JAXON: Patent with mild origin stenosis and poststenotic dilation.  Renal: Patent right renal artery. There is mild origin stenosis of the left  renal artery.    Moderate calcified plaque throughout the lower extremity arteries with  patency as below.    INFLOW  Right  Common Iliac: Patent.  External Iliac: Patent.  Internal Iliac: Patent.    Left  Common Iliac: Patent.  External Iliac: Patent.  Internal Iliac: Patent.    OUTFLOW  Right  Common Femoral: Patent.  Profunda Femoral: Patent.  Superficial Femoral: There are mild multifocal stenoses involving the mid  and distal SFA best seen on the curved planar  reformats and centerline.  There is focal high-grade stenosis of the right mid SFA (3/899).  Popliteal: Patent.    Left  Common Femoral: Patent.  Profunda Femoral: Patent.  Superficial Femoral: There is mild to moderate mid SFA stenosis secondary  to predominantly soft plaque and best seen on the curved planar reformats  (505/13, 3/954, 3/1004).  Popliteal: Patent.    RUN-OFF  Right  3-vessel runoff to the foot.  Tibioperoneal Trunk: Patent.  Peroneal: Patent.  Posterior Tibial: Patent.  Anterior Tibial: There is high-grade proximal stenosis (3/1502)  corresponding with findings on sonography.    Dorsalis Pedis: Patent.  Medial Plantar: Patent.    Left  3-vessel runoff to the foot.  Tibioperoneal Trunk: Mild diffuse stenosis secondary to calcified plaque  (3/1 443).  Peroneal: Moderate stenosis (3/1611).  Posterior Tibial: Patent. Focal stenosis seen proximally (3/1574),  moderate. High-grade origin stenosis (3/1451).  Anterior Tibial: Early origin, patent    Dorsalis Pedis: Patent.  Medial Plantar: Patent.      NON-VASCULAR  Lower Chest: There is a cysts partially visualized 6 mm subpleural nodule  in the right lower lobe (2/1) which appears to have been present on the  prior exam from 2011 (3/20).    ABDOMEN/PELVIS  Evaluation limited by arterial phase acquisition.    Liver: Normal.  Biliary: Normal.  Gallbladder: Normal.  Pancreas: Normal.  Spleen: Normal.  Kidneys: Right renal simple cyst.  Adrenals: Normal.  Abdominal Wall: Normal.  Bowel/Mesentery: Normal.  Pelvis: Unremarkable.    Bones/Soft Tissues: No suspicious lesion.      Impression    IMPRESSION:  RIGHT    Inflow: Patent    Outflow: Moderate to severe mid SFA stenosis as above.    Run-off: Three-vessel. Focal critical stenosis in the anterior tibial  artery proximally.    LEFT    Inflow: Patent    Outflow: Mild to moderate multifocal SFA stenoses as above.    Run-off: Three-vessel.. Posterior tibial artery stenoses as above.  Moderate peroneal stenosis  as above.    OTHER IMPRESSION  1.  No concerning non-vascular findings.        CT Angiogram Abdomen:  No results found. However, due to the size of the patient record, not all encounters were searched. Please check Results Review for a complete set of results.    CT Angio Chest:  No results found. However, due to the size of the patient record, not all encounters were searched. Please check Results Review for a complete set of results.    CT Angio Thoracic Aorta:  No results found. However, due to the size of the patient record, not all encounters were searched. Please check Results Review for a complete set of results.    US Abdomen Complete:  No results found. However, due to the size of the patient record, not all encounters were searched. Please check Results Review for a complete set of results.    US ADRIANNA Rest and Stress Bilateral:  No results found. However, due to the size of the patient record, not all encounters were searched. Please check Results Review for a complete set of results.    Venous Insufficiency Study Bilat:  No results found. However, due to the size of the patient record, not all encounters were searched. Please check Results Review for a complete set of results.    Lab Visit:     Anti-Coag on 12/31/2024   Component Date Value Ref Range Status    INR 12/31/2024 1.8 (A)  2.0 - 3.0 Final       Cath Report:  Results for orders placed or performed during the hospital encounter of 02/15/21   Cath/PV Case    Narrative    ASSESSMENT  US was used for access. The vessel was visualized, it was patent, and an   image was stored for the record.  L femoral artery used for access  Distal calcified ectatic aorta   Widely patent Common iliac arteries bilaterally   Calcified SFAs bilaterally without significant stenosis  Bilateral 3 vessel runoff  Short focal area of severe stenosis on proximal R anterior tibial artery    IMPRESSION  LE angiogram showing no significant inflow disease with bilateral 3V    runoff. Will plan to treat medically and for patient to attend rehab   program.    PLAN  Bed rest 4 hrs  D/C later today  Restart lovenox and warfarin tonight  Start Pletal 50mg BID  PAD rehab clinic referral     Procedure was performed and plan formulated by Dr. Vega.     Sae Melo MD  Cardiology Fellow PGY V  [phone]    I was scrubbed in and present for the entire procedure.  The findings and   plan were formulated under my direction.    Please feel free to call with questions or concerns.      Heber Vega DO, [facility]  Interventional Cardiology   Pager # [phone]             Tate Cheney MD     no

## 2025-01-16 ENCOUNTER — APPOINTMENT (OUTPATIENT)
Facility: CLINIC | Age: 70
End: 2025-01-16
Payer: MEDICARE

## 2025-01-16 VITALS
DIASTOLIC BLOOD PRESSURE: 82 MMHG | WEIGHT: 214 LBS | SYSTOLIC BLOOD PRESSURE: 124 MMHG | BODY MASS INDEX: 29.02 KG/M2 | HEART RATE: 70 BPM

## 2025-01-16 PROCEDURE — 99214 OFFICE O/P EST MOD 30 MIN: CPT

## 2025-01-16 PROCEDURE — 93000 ELECTROCARDIOGRAM COMPLETE: CPT

## 2025-01-16 PROCEDURE — G2211 COMPLEX E/M VISIT ADD ON: CPT

## 2025-01-28 ENCOUNTER — OUTPATIENT (OUTPATIENT)
Dept: OUTPATIENT SERVICES | Facility: HOSPITAL | Age: 70
LOS: 1 days | Discharge: HOME | End: 2025-01-28
Payer: MEDICARE

## 2025-01-28 VITALS
OXYGEN SATURATION: 98 % | DIASTOLIC BLOOD PRESSURE: 62 MMHG | HEART RATE: 66 BPM | RESPIRATION RATE: 16 BRPM | SYSTOLIC BLOOD PRESSURE: 131 MMHG | TEMPERATURE: 97 F

## 2025-01-28 VITALS
DIASTOLIC BLOOD PRESSURE: 61 MMHG | OXYGEN SATURATION: 97 % | RESPIRATION RATE: 17 BRPM | SYSTOLIC BLOOD PRESSURE: 116 MMHG | HEART RATE: 66 BPM

## 2025-01-28 DIAGNOSIS — Z98.1 ARTHRODESIS STATUS: Chronic | ICD-10-CM

## 2025-01-28 DIAGNOSIS — Z98.49 CATARACT EXTRACTION STATUS, UNSPECIFIED EYE: Chronic | ICD-10-CM

## 2025-01-28 DIAGNOSIS — Z98.890 OTHER SPECIFIED POSTPROCEDURAL STATES: Chronic | ICD-10-CM

## 2025-01-28 DIAGNOSIS — I48.19 OTHER PERSISTENT ATRIAL FIBRILLATION: ICD-10-CM

## 2025-01-28 PROCEDURE — C1764: CPT

## 2025-01-28 PROCEDURE — 33285 INSJ SUBQ CAR RHYTHM MNTR: CPT

## 2025-01-28 RX ORDER — CEPHALEXIN 500 MG
500 CAPSULE ORAL ONCE
Refills: 0 | Status: COMPLETED | OUTPATIENT
Start: 2025-01-28 | End: 2025-01-28

## 2025-01-28 RX ADMIN — Medication 500 MILLIGRAM(S): at 08:43

## 2025-01-28 NOTE — H&P ADULT - NSHPPHYSICALEXAM_GEN_ALL_CORE
GENERAL:  70y/o Male NAD, resting comfortably.  HEAD:  Atraumatic, Normocephalic  EYES: EOMI, PERRLA, conjunctiva and sclera clear  NECK: Supple, No JVD, no cervical lymphadenopathy, non-tender  CHEST/LUNG: Clear to auscultation bilaterally; No wheeze, rhonchi, or rales  HEART: Regular rate and rhythm; S1&S2  ABDOMEN: Soft, Nontender, Nondistended x 4 quadrants; Bowel sounds present  EXTREMITIES:   Peripheral Pulses Present, No clubbing, no cyanosis, or no edema, no calf tenderness  PSYCH: AAOx3, cooperative, appropriate  NEUROLOGY: WNL  SKIN: WNL

## 2025-01-28 NOTE — H&P ADULT - ASSESSMENT
68-year-old male with PMHx of HTN, DLD, aortic root dilatation s/p aortic root replacement without aortic valve replacement, family history of CAD and AF is here for the placement of an implantable loop recorder for long-term monitoring with the plan to eventually discontinue of Eliquis. 68-year-old male with PMHx of HTN, DLD, aortic root dilatation s/p aortic root replacement without aortic valve replacement, family history of CAD and AF is here for the placement of an implantable loop recorder for long-term monitoring with the plan to eventually discontinue of Eliquis.    Plan:  -Loop implant today  -Home following procedure  -Resume all home meds  -Resume usual activity  -F/u in 1 month with Dr. Fermin

## 2025-01-28 NOTE — H&P ADULT - HISTORY OF PRESENT ILLNESS
68-year-old male with PMHx of HTN, DLD, aortic root dilatation s/p aortic root replacement without aortic valve replacement, family history of CAD and AF is here for the placement of an implantable loop recorder for long-term monitoring with the plan to eventually discontinue of Eliquis.
dyspnea upon exertion

## 2025-01-28 NOTE — CHART NOTE - NSCHARTNOTEFT_GEN_A_CORE
Electrophysiology Brief Post-Op Note    I have personally seen and examined the patient.  I agree with the history and physical which I have reviewed and noted any changes below.  01-28-25 @ 09:46    PRE-OP DIAGNOSIS: Palpitations / AF/ Syncope / Cryptogenic CVA    POST-OP DIAGNOSIS: Palpitations / AF/ Syncope / Cryptogenic CVA    PROCEDURE: Loop Implant    Physician:    Assistant: ANGELA Ferguson  Referring: Dr. Mile Fermin    ESTIMATED BLOOD LOSS:  2  mL    ANESTHESIA TYPE:  [  ]General Anesthesia  [  ] Sedation  [X  ] Local/Regional      CONDITION  [  ] Critical  [  ] Serious  [  ]Fair  [ X ]Good      SPECIMENS REMOVED (IF APPLICABLE):  none      IMPLANTS (IF APPLICABLE)  Loop Recorder ([employer])  Serial #[id number]  RWA:       PLAN OF CARE  - F/U 3-4 weeks Dr. Fermin  - May remove bandaid in 2 days  - May shower in 48 hours Electrophysiology Brief Post-Op Note    I have personally seen and examined the patient.  I agree with the history and physical which I have reviewed and noted any changes below.  01-28-25 @ 09:46    PRE-OP DIAGNOSIS: Palpitations / AF/ Syncope / Cryptogenic CVA    POST-OP DIAGNOSIS: Palpitations / AF/ Syncope / Cryptogenic CVA    PROCEDURE: Loop Implant    Physician:    Assistant: ANGELA Ferguson  Referring: Dr. Mile Fermin    ESTIMATED BLOOD LOSS:  2  mL    ANESTHESIA TYPE:  [  ]General Anesthesia  [  ] Sedation  [X  ] Local/Regional      CONDITION  [  ] Critical  [  ] Serious  [  ]Fair  [ X ]Good      SPECIMENS REMOVED (IF APPLICABLE):  none      IMPLANTS (IF APPLICABLE)  Loop Recorder ([employer])  Serial #[id number]  RWA: 0.24      PLAN OF CARE  - F/U 3-4 weeks Dr. Fermin  - May remove bandaid in 2 days  - May shower in 48 hours Electrophysiology Brief Post-Op Note    I have personally seen and examined the patient.  I agree with the history and physical which I have reviewed and noted any changes below.  01-28-25 @ 09:46    PRE-OP DIAGNOSIS: AF    POST-OP DIAGNOSIS: AF    PROCEDURE: Loop Implant    Physician:    Assistant: ANGELA Ferguson  Referring: Dr. Mile Fermin    ESTIMATED BLOOD LOSS:  2  mL    ANESTHESIA TYPE:  [  ]General Anesthesia  [  ] Sedation  [X  ] Local/Regional      CONDITION  [  ] Critical  [  ] Serious  [  ]Fair  [ X ]Good      SPECIMENS REMOVED (IF APPLICABLE):  none      IMPLANTS (IF APPLICABLE)  Loop Recorder ([employer])  Serial #[id number]  RWA: 0.24      PLAN OF CARE  - F/U 3-4 weeks Dr. Fermin  - May remove bandaid in 2 days  - May shower in 48 hours

## 2025-01-30 DIAGNOSIS — I48.91 UNSPECIFIED ATRIAL FIBRILLATION: ICD-10-CM

## 2025-02-27 ENCOUNTER — NON-APPOINTMENT (OUTPATIENT)
Age: 70
End: 2025-02-27

## 2025-02-27 ENCOUNTER — APPOINTMENT (OUTPATIENT)
Facility: CLINIC | Age: 70
End: 2025-02-27
Payer: MEDICARE

## 2025-02-27 ENCOUNTER — TRANSCRIPTION ENCOUNTER (OUTPATIENT)
Age: 70
End: 2025-02-27

## 2025-02-27 VITALS
SYSTOLIC BLOOD PRESSURE: 120 MMHG | WEIGHT: 214 LBS | HEIGHT: 72 IN | RESPIRATION RATE: 14 BRPM | BODY MASS INDEX: 28.99 KG/M2 | OXYGEN SATURATION: 96 % | HEART RATE: 59 BPM | DIASTOLIC BLOOD PRESSURE: 68 MMHG

## 2025-02-27 PROCEDURE — 93000 ELECTROCARDIOGRAM COMPLETE: CPT | Mod: 59

## 2025-02-27 PROCEDURE — 93285 PRGRMG DEV EVAL SCRMS IP: CPT

## 2025-02-27 PROCEDURE — 99214 OFFICE O/P EST MOD 30 MIN: CPT

## 2025-03-31 ENCOUNTER — APPOINTMENT (OUTPATIENT)
Dept: CARDIOLOGY | Facility: CLINIC | Age: 70
End: 2025-03-31
Payer: MEDICARE

## 2025-03-31 ENCOUNTER — NON-APPOINTMENT (OUTPATIENT)
Age: 70
End: 2025-03-31

## 2025-03-31 PROCEDURE — 93298 REM INTERROG DEV EVAL SCRMS: CPT

## 2025-05-02 ENCOUNTER — NON-APPOINTMENT (OUTPATIENT)
Age: 70
End: 2025-05-02

## 2025-05-02 ENCOUNTER — APPOINTMENT (OUTPATIENT)
Dept: CARDIOLOGY | Facility: CLINIC | Age: 70
End: 2025-05-02
Payer: MEDICARE

## 2025-05-02 PROCEDURE — 93298 REM INTERROG DEV EVAL SCRMS: CPT

## 2025-06-06 ENCOUNTER — NON-APPOINTMENT (OUTPATIENT)
Age: 70
End: 2025-06-06

## 2025-06-06 ENCOUNTER — APPOINTMENT (OUTPATIENT)
Dept: CARDIOLOGY | Facility: CLINIC | Age: 70
End: 2025-06-06
Payer: MEDICARE

## 2025-06-06 PROCEDURE — 93298 REM INTERROG DEV EVAL SCRMS: CPT

## 2025-07-11 ENCOUNTER — NON-APPOINTMENT (OUTPATIENT)
Age: 70
End: 2025-07-11

## 2025-07-11 ENCOUNTER — APPOINTMENT (OUTPATIENT)
Dept: CARDIOLOGY | Facility: CLINIC | Age: 70
End: 2025-07-11
Payer: MEDICARE

## 2025-07-11 PROCEDURE — 93298 REM INTERROG DEV EVAL SCRMS: CPT

## 2025-08-28 ENCOUNTER — APPOINTMENT (OUTPATIENT)
Facility: CLINIC | Age: 70
End: 2025-08-28

## 2025-08-28 VITALS
WEIGHT: 209 LBS | HEIGHT: 72 IN | BODY MASS INDEX: 28.31 KG/M2 | SYSTOLIC BLOOD PRESSURE: 120 MMHG | OXYGEN SATURATION: 98 % | HEART RATE: 53 BPM | DIASTOLIC BLOOD PRESSURE: 70 MMHG

## 2025-08-28 PROCEDURE — 93000 ELECTROCARDIOGRAM COMPLETE: CPT | Mod: 59

## 2025-08-28 PROCEDURE — 93285 PRGRMG DEV EVAL SCRMS IP: CPT

## 2025-08-28 PROCEDURE — 99204 OFFICE O/P NEW MOD 45 MIN: CPT

## 2025-08-28 RX ORDER — ATORVASTATIN CALCIUM 40 MG/1
40 TABLET, FILM COATED ORAL
Refills: 0 | Status: ACTIVE | COMMUNITY

## 2025-08-28 RX ORDER — HYDROCHLOROTHIAZIDE 12.5 MG/1
12.5 CAPSULE ORAL
Refills: 0 | Status: ACTIVE | COMMUNITY

## 2025-08-28 RX ORDER — OLMESARTAN MEDOXOMIL 40 MG/1
40 TABLET, FILM COATED ORAL
Refills: 0 | Status: ACTIVE | COMMUNITY

## 2025-08-28 RX ORDER — APIXABAN 5 MG/1
5 TABLET, FILM COATED ORAL
Refills: 0 | Status: ACTIVE | COMMUNITY

## 2025-08-28 RX ORDER — NEBIVOLOL 10 MG/1
10 TABLET ORAL
Refills: 0 | Status: ACTIVE | COMMUNITY

## 2025-08-28 RX ORDER — AMLODIPINE BESYLATE 2.5 MG/1
2.5 TABLET ORAL
Refills: 0 | Status: ACTIVE | COMMUNITY